# Patient Record
Sex: FEMALE | Race: WHITE | NOT HISPANIC OR LATINO | Employment: STUDENT | URBAN - METROPOLITAN AREA
[De-identification: names, ages, dates, MRNs, and addresses within clinical notes are randomized per-mention and may not be internally consistent; named-entity substitution may affect disease eponyms.]

---

## 2021-06-04 ENCOUNTER — IMMUNIZATION (OUTPATIENT)
Dept: FAMILY MEDICINE | Facility: CLINIC | Age: 21
End: 2021-06-04
Payer: COMMERCIAL

## 2021-06-04 ENCOUNTER — OFFICE VISIT (OUTPATIENT)
Dept: FAMILY MEDICINE | Facility: CLINIC | Age: 21
End: 2021-06-04
Payer: COMMERCIAL

## 2021-06-04 VITALS
TEMPERATURE: 97.5 F | RESPIRATION RATE: 16 BRPM | HEART RATE: 68 BPM | BODY MASS INDEX: 29.32 KG/M2 | WEIGHT: 186.8 LBS | HEIGHT: 67 IN | OXYGEN SATURATION: 98 % | SYSTOLIC BLOOD PRESSURE: 100 MMHG | DIASTOLIC BLOOD PRESSURE: 60 MMHG

## 2021-06-04 DIAGNOSIS — E66.3 OVERWEIGHT (BMI 25.0-29.9): ICD-10-CM

## 2021-06-04 DIAGNOSIS — Z11.3 SCREENING FOR STDS (SEXUALLY TRANSMITTED DISEASES): ICD-10-CM

## 2021-06-04 DIAGNOSIS — Z30.011 ENCOUNTER FOR INITIAL PRESCRIPTION OF CONTRACEPTIVE PILLS: ICD-10-CM

## 2021-06-04 DIAGNOSIS — Z00.00 ROUTINE GENERAL MEDICAL EXAMINATION AT A HEALTH CARE FACILITY: Primary | ICD-10-CM

## 2021-06-04 LAB — HCG UR QL: NEGATIVE

## 2021-06-04 PROCEDURE — 81025 URINE PREGNANCY TEST: CPT | Performed by: PHYSICIAN ASSISTANT

## 2021-06-04 PROCEDURE — 91301 COVID-19,PF,MODERNA: CPT

## 2021-06-04 PROCEDURE — 87491 CHLMYD TRACH DNA AMP PROBE: CPT | Performed by: PHYSICIAN ASSISTANT

## 2021-06-04 PROCEDURE — 99385 PREV VISIT NEW AGE 18-39: CPT | Performed by: PHYSICIAN ASSISTANT

## 2021-06-04 PROCEDURE — 99213 OFFICE O/P EST LOW 20 MIN: CPT | Mod: 25 | Performed by: PHYSICIAN ASSISTANT

## 2021-06-04 PROCEDURE — 87591 N.GONORRHOEAE DNA AMP PROB: CPT | Performed by: PHYSICIAN ASSISTANT

## 2021-06-04 PROCEDURE — 0011A COVID-19,PF,MODERNA: CPT

## 2021-06-04 RX ORDER — NORETHINDRONE ACETATE AND ETHINYL ESTRADIOL .02; 1 MG/1; MG/1
1 TABLET ORAL DAILY
Qty: 84 TABLET | Refills: 3 | Status: SHIPPED | OUTPATIENT
Start: 2021-06-04 | End: 2022-08-16

## 2021-06-04 SDOH — HEALTH STABILITY: MENTAL HEALTH: HOW OFTEN DO YOU HAVE A DRINK CONTAINING ALCOHOL?: NEVER

## 2021-06-04 ASSESSMENT — ENCOUNTER SYMPTOMS
PALPITATIONS: 0
DYSURIA: 0
JOINT SWELLING: 0
WEAKNESS: 0
SHORTNESS OF BREATH: 0
ARTHRALGIAS: 0
FREQUENCY: 0
CONSTIPATION: 0
COUGH: 0
MYALGIAS: 0
NAUSEA: 0
EYE PAIN: 0
BREAST MASS: 0
HEMATURIA: 0
DIARRHEA: 0
NERVOUS/ANXIOUS: 0
PARESTHESIAS: 0
HEMATOCHEZIA: 0
HEADACHES: 0
FEVER: 0
HEARTBURN: 0
ABDOMINAL PAIN: 0
SORE THROAT: 0
CHILLS: 0
DIZZINESS: 0

## 2021-06-04 ASSESSMENT — PAIN SCALES - GENERAL: PAINLEVEL: NO PAIN (0)

## 2021-06-04 ASSESSMENT — MIFFLIN-ST. JEOR: SCORE: 1647.01

## 2021-06-04 NOTE — PROGRESS NOTES
SUBJECTIVE:   CC: Laura Blair is an 20 year old woman who presents for preventive health visit.       Patient has been advised of split billing requirements and indicates understanding: Yes  Healthy Habits:     Getting at least 3 servings of Calcium per day:  Yes    Bi-annual eye exam:  Yes    Dental care twice a year:  Yes    Sleep apnea or symptoms of sleep apnea:  None    Diet:  Regular (no restrictions)    Frequency of exercise:  2-3 days/week    Duration of exercise:  45-60 minutes    Taking medications regularly:  Yes    Medication side effects:  None    PHQ-2 Total Score: 0    Additional concerns today:  No    New patient - previous care through [facility].    Would like to start birth control. She is sexually active, uses condoms. No concern for STI. Has never been on birth control in the past. Is interested in OCP. Non-smoker, no migraine with aura, no family or personal history of blood clotting disorder. No HTN. Has irregular periods. LMP was sometime in May (she is not exactly sure when).    She just finished her freshman year at [employer]. Had to do all distance learning (aside from science labs) due to COVID pandemic. Planning on in person learning next year and looking forward to this.    Today's PHQ-2 Score:   PHQ-2 ( 1999 Pfizer) 6/4/2021   Q1: Little interest or pleasure in doing things 0   Q2: Feeling down, depressed or hopeless 0   PHQ-2 Score 0   Q1: Little interest or pleasure in doing things Not at all   Q2: Feeling down, depressed or hopeless Not at all   PHQ-2 Score 0       Abuse: Current or Past (Physical, Sexual or Emotional) - No  Do you feel safe in your environment? Yes        Social History     Tobacco Use     Smoking status: Never Smoker     Smokeless tobacco: Never Used   Substance Use Topics     Alcohol use: Never     Frequency: Never         Alcohol Use 6/4/2021   Prescreen: >3 drinks/day or >7 drinks/week? No       Reviewed orders with patient.  Reviewed  health maintenance and updated orders accordingly - Yes  Lab work is in process  Labs reviewed in EPIC  BP Readings from Last 3 Encounters:   06/04/21 100/60    Wt Readings from Last 3 Encounters:   06/04/21 84.7 kg (186 lb 12.8 oz) (96 %, Z= 1.73)*     * Growth percentiles are based on [facility] (Girls, 2-20 Years) data.                  Patient Active Problem List   Diagnosis     Overweight (BMI 25.0-29.9)     History reviewed. No pertinent surgical history.    Social History     Tobacco Use     Smoking status: Never Smoker     Smokeless tobacco: Never Used   Substance Use Topics     Alcohol use: Never     Frequency: Never     Family History   Problem Relation Age of Onset     Diabetes Father      Diabetes Paternal Grandmother      Heart Disease Paternal Grandmother      Diabetes Paternal Grandfather      No Known Problems Mother          Current Outpatient Medications   Medication Sig Dispense Refill     norethindrone-ethinyl estradiol (MICROGESTIN 1/20) 1-20 MG-MCG tablet Take 1 tablet by mouth daily 84 tablet 3     Allergies   Allergen Reactions     Zithromax [Azithromycin]      Nausea/vomiting        Breast Cancer Screening:        History of abnormal Pap smear: NO - under age 21, PAP not appropriate for age     Reviewed and updated as needed this visit by clinical staff  Tobacco  Allergies  Meds  Problems  Med Hx  Surg Hx  Fam Hx  Soc Hx          Reviewed and updated as needed this visit by Provider  Tobacco  Allergies  Meds  Problems  Med Hx  Surg Hx  Fam Hx  Soc Hx             Review of Systems   Constitutional: Negative for chills and fever.   HENT: Negative for congestion, ear pain, hearing loss and sore throat.    Eyes: Negative for pain and visual disturbance.   Respiratory: Negative for cough and shortness of breath.    Cardiovascular: Negative for chest pain, palpitations and peripheral edema.   Gastrointestinal: Negative for abdominal pain, constipation, diarrhea, heartburn, hematochezia and  "nausea.   Breasts:  Negative for tenderness, breast mass and discharge.   Genitourinary: Negative for dysuria, frequency, genital sores, hematuria, pelvic pain, urgency, vaginal bleeding and vaginal discharge.   Musculoskeletal: Negative for arthralgias, joint swelling and myalgias.   Skin: Negative for rash.   Neurological: Negative for dizziness, weakness, headaches and paresthesias.   Psychiatric/Behavioral: Negative for mood changes. The patient is not nervous/anxious.         OBJECTIVE:   /60 (BP Location: Right arm, Cuff Size: Adult Regular)   Pulse 68   Temp 97.5  F (36.4  C) (Oral)   Resp 16   Ht 1.689 m (5' 6.5\")   Wt 84.7 kg (186 lb 12.8 oz)   LMP 04/12/2021   SpO2 98%   BMI 29.70 kg/m    Physical Exam  GENERAL: healthy, alert and no distress  EYES: Eyes grossly normal to inspection, PERRL and conjunctivae and sclerae normal  HENT: ear canals and TM's normal, nose and mouth without ulcers or lesions  NECK: no adenopathy, no asymmetry, masses, or scars and thyroid normal to palpation  RESP: lungs clear to auscultation - no rales, rhonchi or wheezes  BREAST: patient declined  CV: regular rate and rhythm, normal S1 S2, no S3 or S4, no murmur, click or rub, no peripheral edema and peripheral pulses strong  ABDOMEN: soft, nontender, no hepatosplenomegaly, no masses and bowel sounds normal   (female): deferred  MS: no gross musculoskeletal defects noted, no edema  SKIN: no suspicious lesions or rashes  NEURO: Normal strength and tone, mentation intact and speech normal  PSYCH: mentation appears normal, affect normal/bright    Diagnostic Test Results:  Labs reviewed in Epic  Results for orders placed or performed in visit on 06/04/21 (from the past 24 hour(s))   HCG Qual, Urine ([id number])   Result Value Ref Range    HCG Qual Urine Negative NEG^Negative       ASSESSMENT/PLAN:   1. Routine general medical examination at a health care facility  Reviewed personal and family history. Reviewed age " "appropriate screenings. Recommended any needed vaccinations. Continue to focus on well balanced diet and exercise. She is getting her COVID vaccine later today. Declines HIV/Hep C testing today, will consider next time we need labs.    2. Screening for STDs (sexually transmitted diseases)  Sexually active but denies concern for STI. Will check G/C. Declines blood testing.  - NEISSERIA GONORRHOEA PCR  - CHLAMYDIA TRACHOMATIS PCR    3. Encounter for initial prescription of contraceptive pills  Risks, benefits and alternatives were discussed with patient. Agreeable to the plan of care. Recheck in 1 year. Discussed quick start or waiting until next cycle. Also discussed use of back up method once starting and while on antibiotics. Does not protect against STDs. UPT negative today. Has had irregular periods - discussed OCP can help regulate periods. She will let me know if still having irregular periods.  - norethindrone-ethinyl estradiol (MICROGESTIN 1/20) 1-20 MG-MCG tablet; Take 1 tablet by mouth daily  Dispense: 84 tablet; Refill: 3  - HCG Qual, Urine ([id number])  - OFFICE/OUTPT VISIT,KIMBERLY BAKER III    4. Overweight (BMI 25.0-29.9)  Discussed healthy diet/exercise    Patient has been advised of split billing requirements and indicates understanding: Yes  COUNSELING:  Reviewed preventive health counseling, as reflected in patient instructions    Estimated body mass index is 29.7 kg/m  as calculated from the following:    Height as of this encounter: 1.689 m (5' 6.5\").    Weight as of this encounter: 84.7 kg (186 lb 12.8 oz).    Weight management plan: Discussed healthy diet and exercise guidelines    She reports that she has never smoked. She has never used smokeless tobacco.      Counseling Resources:  ATP IV Guidelines  Pooled Cohorts Equation Calculator  Breast Cancer Risk Calculator  BRCA-Related Cancer Risk Assessment: FHS-7 Tool  FRAX Risk Assessment  ICSI Preventive Guidelines  Dietary Guidelines for Americans, " 2010  USDA's MyPlate  ASA Prophylaxis  Lung CA Screening    AVTAR Castro [facility]

## 2021-06-05 LAB
C TRACH DNA SPEC QL NAA+PROBE: NEGATIVE
N GONORRHOEA DNA SPEC QL NAA+PROBE: NEGATIVE
SPECIMEN SOURCE: NORMAL
SPECIMEN SOURCE: NORMAL

## 2021-07-02 ENCOUNTER — IMMUNIZATION (OUTPATIENT)
Dept: FAMILY MEDICINE | Facility: CLINIC | Age: 21
End: 2021-07-02
Attending: NURSE PRACTITIONER
Payer: COMMERCIAL

## 2021-07-02 PROCEDURE — 0012A PR COVID VAC MODERNA 100 MCG/0.5 ML IM: CPT

## 2021-07-02 PROCEDURE — 91301 PR COVID VAC MODERNA 100 MCG/0.5 ML IM: CPT

## 2021-10-17 ENCOUNTER — HEALTH MAINTENANCE LETTER (OUTPATIENT)
Age: 21
End: 2021-10-17

## 2021-12-20 ENCOUNTER — OFFICE VISIT (OUTPATIENT)
Dept: FAMILY MEDICINE | Facility: CLINIC | Age: 21
End: 2021-12-20
Payer: COMMERCIAL

## 2021-12-20 VITALS
HEART RATE: 52 BPM | BODY MASS INDEX: 28.24 KG/M2 | TEMPERATURE: 98 F | WEIGHT: 177.6 LBS | RESPIRATION RATE: 12 BRPM | DIASTOLIC BLOOD PRESSURE: 62 MMHG | SYSTOLIC BLOOD PRESSURE: 100 MMHG

## 2021-12-20 DIAGNOSIS — R55 SYNCOPE, UNSPECIFIED SYNCOPE TYPE: Primary | ICD-10-CM

## 2021-12-20 DIAGNOSIS — R63.4 WEIGHT LOSS: ICD-10-CM

## 2021-12-20 LAB
ALBUMIN SERPL-MCNC: 3.8 G/DL (ref 3.4–5)
ALP SERPL-CCNC: 68 U/L (ref 40–150)
ALT SERPL W P-5'-P-CCNC: 23 U/L (ref 0–50)
ANION GAP SERPL CALCULATED.3IONS-SCNC: 2 MMOL/L (ref 3–14)
AST SERPL W P-5'-P-CCNC: 9 U/L (ref 0–45)
BASOPHILS # BLD AUTO: 0 10E3/UL (ref 0–0.2)
BASOPHILS NFR BLD AUTO: 0 %
BILIRUB SERPL-MCNC: 0.4 MG/DL (ref 0.2–1.3)
BUN SERPL-MCNC: 9 MG/DL (ref 7–30)
CALCIUM SERPL-MCNC: 9.5 MG/DL (ref 8.5–10.1)
CHLORIDE BLD-SCNC: 107 MMOL/L (ref 94–109)
CO2 SERPL-SCNC: 30 MMOL/L (ref 20–32)
CREAT SERPL-MCNC: 0.76 MG/DL (ref 0.52–1.04)
EOSINOPHIL # BLD AUTO: 0.2 10E3/UL (ref 0–0.7)
EOSINOPHIL NFR BLD AUTO: 2 %
ERYTHROCYTE [DISTWIDTH] IN BLOOD BY AUTOMATED COUNT: 12.5 % (ref 10–15)
FERRITIN SERPL-MCNC: 40 NG/ML (ref 12–150)
GFR SERPL CREATININE-BSD FRML MDRD: >90 ML/MIN/1.73M2
GLUCOSE BLD-MCNC: 91 MG/DL (ref 70–99)
HBA1C MFR BLD: 5.2 % (ref 0–5.6)
HCT VFR BLD AUTO: 41.9 % (ref 35–47)
HGB BLD-MCNC: 13.5 G/DL (ref 11.7–15.7)
IRON SATN MFR SERPL: 19 % (ref 15–46)
IRON SERPL-MCNC: 64 UG/DL (ref 35–180)
LYMPHOCYTES # BLD AUTO: 2.5 10E3/UL (ref 0.8–5.3)
LYMPHOCYTES NFR BLD AUTO: 33 %
MCH RBC QN AUTO: 28.1 PG (ref 26.5–33)
MCHC RBC AUTO-ENTMCNC: 32.2 G/DL (ref 31.5–36.5)
MCV RBC AUTO: 87 FL (ref 78–100)
MONOCYTES # BLD AUTO: 0.5 10E3/UL (ref 0–1.3)
MONOCYTES NFR BLD AUTO: 7 %
NEUTROPHILS # BLD AUTO: 4.4 10E3/UL (ref 1.6–8.3)
NEUTROPHILS NFR BLD AUTO: 58 %
PLATELET # BLD AUTO: 350 10E3/UL (ref 150–450)
POTASSIUM BLD-SCNC: 3.8 MMOL/L (ref 3.4–5.3)
PROT SERPL-MCNC: 7.3 G/DL (ref 6.8–8.8)
RBC # BLD AUTO: 4.81 10E6/UL (ref 3.8–5.2)
SODIUM SERPL-SCNC: 139 MMOL/L (ref 133–144)
TIBC SERPL-MCNC: 343 UG/DL (ref 240–430)
TSH SERPL DL<=0.005 MIU/L-ACNC: 1.41 MU/L (ref 0.4–4)
WBC # BLD AUTO: 7.5 10E3/UL (ref 4–11)

## 2021-12-20 PROCEDURE — 82728 ASSAY OF FERRITIN: CPT | Performed by: NURSE PRACTITIONER

## 2021-12-20 PROCEDURE — 83550 IRON BINDING TEST: CPT | Performed by: NURSE PRACTITIONER

## 2021-12-20 PROCEDURE — 36415 COLL VENOUS BLD VENIPUNCTURE: CPT | Performed by: NURSE PRACTITIONER

## 2021-12-20 PROCEDURE — 80050 GENERAL HEALTH PANEL: CPT | Performed by: NURSE PRACTITIONER

## 2021-12-20 PROCEDURE — 83036 HEMOGLOBIN GLYCOSYLATED A1C: CPT | Performed by: NURSE PRACTITIONER

## 2021-12-20 PROCEDURE — 99214 OFFICE O/P EST MOD 30 MIN: CPT | Performed by: NURSE PRACTITIONER

## 2021-12-20 ASSESSMENT — PAIN SCALES - GENERAL: PAINLEVEL: NO PAIN (0)

## 2021-12-20 NOTE — PROGRESS NOTES
"  Assessment & Plan     Syncope, unspecified syncope type  Discussed initial eval with labs.  Could very well be due to menorrhagia.  If labs are normal will likely order echo and zio patch.  4 week follow up.  Pt agrees with plan and verbalized understanding.  - CBC with platelets and differential; Future  - Comprehensive metabolic panel (BMP + Alb, Alk Phos, ALT, AST, Total. Bili, TP); Future  - Hemoglobin A1c; Future  - TSH with free T4 reflex; Future  - Ferritin; Future  - Iron and iron binding capacity; Future    Weight loss  Labs today, recheck in 4 weeks.       BMI:   Estimated body mass index is 28.24 kg/m  as calculated from the following:    Height as of 6/4/21: 1.689 m (5' 6.5\").    Weight as of this encounter: 80.6 kg (177 lb 9.6 oz).       Return in about 4 weeks (around 1/17/2022) for follow up.    ELY Cunningham Ra CNP  M [facility] KEVEN Sanchez is a 21 year old who presents for the following health issues     HPI     Dizziness  Onset/Duration: 6 days ago  Description:   Do you feel faint: YES- patient fainted   Does it feel like the surroundings (bed, room) are moving: no  Unsteady/off balance: YES  Have you passed out or fallen: YES- patient passed out last Tuesday   Intensity: mild  Progression of Symptoms: improving  Accompanying Signs & Symptoms:  Heart palpitations or chest pain: no  Nausea, vomiting: YES  Weakness or lack of coordination in arms or legs: no  Vision or speech changes: YES- blurry vision   Numbness or tingling: no  Ringing in ears (Tinnitus): no  Hearing Loss: no  History:   Head trauma/concussion history: YES  Previous similar symptoms: no, but feeling really tired all of the time and bruising very easily.    Recent bleeding history: no  Any new medications (BP?): no  Precipitating factors:   Worse with activity: no  Worse with head movement: no  Alleviating factors:   Does staying in a fixed position give relief: no   Therapies tried and " outcome: None    Syncopal episode 7 days ago.  Was hanging with friends and passed out.  She bruised her tailbone.  She was not exerting herself at the time.  Felt like she had not taken in enough fluids that day, but had been eating as usual.  She denies chest pain, palpitations, sob.  No LE edema.  She is busy at work and on her feet at [employer].  She also walks her dog for 45 minutes at a time without problem.  She is sleeping without problem.  Periods are heavy.  5 days, first 3 are very heavy.  This is not new.  She does take a combined OCP.  She notes losing about 30# over the past 1 year.  Eating about the same slightly less at school.  She eats about 2.5 meals per day.  She believes her weight has somewhat stabilized but she is still losing weight.  She feels tired most of the time.  She eats a full diet.  No fevers, no recent illness.  Normal urinary and bowel habits, no black or bloody stools.  Mood has been stable.    She did have one other syncopal episode about 2 years ago but believes this was due to intake that day.    Goes to school at [employer].  No etoh or smoking.  + sexually active.    No family hx of enlarged heart or sudden cardiac death.    Review of Systems   Constitutional, HEENT, cardiovascular, pulmonary, gi and gu systems are negative, except as otherwise noted.      Objective    /62 (BP Location: Right arm, Patient Position: Sitting, Cuff Size: Adult Regular)   Pulse 52   Temp 98  F (36.7  C) (Oral)   Resp 12   Wt 80.6 kg (177 lb 9.6 oz)   LMP 12/13/2021   BMI 28.24 kg/m    Body mass index is 28.24 kg/m .  Physical Exam   GENERAL: healthy, alert and no distress  NECK: no adenopathy, no asymmetry, masses, or scars and thyroid normal to palpation  RESP: lungs clear to auscultation - no rales, rhonchi or wheezes  CV: regular rate and rhythm, normal S1 S2, no S3 or S4, no murmur, click or rub, no peripheral edema and peripheral pulses strong  ABDOMEN: soft, nontender, no  hepatosplenomegaly, no masses and bowel sounds normal  NEURO: Normal strength and tone, sensory exam grossly normal, mentation intact, cranial nerves 2-12 intact and rapid alternating movements normal  PSYCH: mentation appears normal, affect normal/bright    No results found for this or any previous visit (from the past 24 hour(s)).

## 2021-12-20 NOTE — NURSING NOTE
"Chief Complaint   Patient presents with     Dizziness     Blood Draw     LABS.       Syncope     Initial /62 (BP Location: Right arm, Patient Position: Sitting, Cuff Size: Adult Regular)   Pulse 52   Temp 98  F (36.7  C) (Oral)   Resp 12   Wt 80.6 kg (177 lb 9.6 oz)   LMP 12/13/2021   BMI 28.24 kg/m   Estimated body mass index is 28.24 kg/m  as calculated from the following:    Height as of 6/4/21: 1.689 m (5' 6.5\").    Weight as of this encounter: 80.6 kg (177 lb 9.6 oz).  BP completed using cuff size regular long right arm    Lisa Magill, CMA    "

## 2022-02-04 ENCOUNTER — OFFICE VISIT (OUTPATIENT)
Dept: FAMILY MEDICINE | Facility: CLINIC | Age: 22
End: 2022-02-04
Payer: COMMERCIAL

## 2022-02-04 VITALS
TEMPERATURE: 97.3 F | OXYGEN SATURATION: 99 % | HEART RATE: 63 BPM | WEIGHT: 177 LBS | DIASTOLIC BLOOD PRESSURE: 72 MMHG | BODY MASS INDEX: 28.14 KG/M2 | SYSTOLIC BLOOD PRESSURE: 110 MMHG | RESPIRATION RATE: 16 BRPM

## 2022-02-04 DIAGNOSIS — B00.1 COLD SORE: ICD-10-CM

## 2022-02-04 DIAGNOSIS — R55 SYNCOPE, UNSPECIFIED SYNCOPE TYPE: ICD-10-CM

## 2022-02-04 DIAGNOSIS — Z23 NEED FOR VACCINATION: Primary | ICD-10-CM

## 2022-02-04 PROCEDURE — 99214 OFFICE O/P EST MOD 30 MIN: CPT | Mod: 25 | Performed by: NURSE PRACTITIONER

## 2022-02-04 PROCEDURE — 90686 IIV4 VACC NO PRSV 0.5 ML IM: CPT | Performed by: NURSE PRACTITIONER

## 2022-02-04 PROCEDURE — 90471 IMMUNIZATION ADMIN: CPT | Performed by: NURSE PRACTITIONER

## 2022-02-04 PROCEDURE — 0064A COVID-19,PF,MODERNA (18+ YRS BOOSTER .25ML): CPT | Performed by: NURSE PRACTITIONER

## 2022-02-04 PROCEDURE — 91306 COVID-19,PF,MODERNA (18+ YRS BOOSTER .25ML): CPT | Performed by: NURSE PRACTITIONER

## 2022-02-04 RX ORDER — VALACYCLOVIR HYDROCHLORIDE 1 G/1
2000 TABLET, FILM COATED ORAL 2 TIMES DAILY
Qty: 4 TABLET | Refills: 4 | Status: SHIPPED | OUTPATIENT
Start: 2022-02-04 | End: 2023-01-09

## 2022-02-04 ASSESSMENT — PAIN SCALES - GENERAL: PAINLEVEL: NO PAIN (0)

## 2022-02-04 NOTE — PROGRESS NOTES
"  Assessment & Plan     Need for vaccination  - COVID-19,PF,MODERNA (18+ YRS BOOSTER .25ML)    Cold sore  - valACYclovir (VALTREX) 1000 mg tablet; Take 2 tablets (2,000 mg) by mouth 2 times daily for 1 day    Syncope, unspecified syncope type- Echocardiogram Complete; Future  - Leadless EKG Monitor 3 to 7 Days; Future  - Adult Cardiology Eval Referral; Future             BMI:   Estimated body mass index is 28.14 kg/m  as calculated from the following:    Height as of 6/4/21: 1.689 m (5' 6.5\").    Weight as of this encounter: 80.3 kg (177 lb).           Return for follow up.    Sade Kim, ELY CNP  [facility] KEVEN Sanchez is a 21 year old who presents for the following health issues  accompanied by her mother.    HPI     Follow up syncopal episodes.  Intermittent, continued episodes of near syncope.  Previous labs wnl.  Weight has stabilized.    Recurrent cold sores, associated with stress.  More stress currently with start of new quarter at school.    Review of Systems   Constitutional, HEENT, cardiovascular, pulmonary, gi and gu systems are negative, except as otherwise noted.      Objective    /72 (BP Location: Right arm, Patient Position: Sitting, Cuff Size: Adult Regular)   Pulse 63   Temp 97.3  F (36.3  C) (Oral)   Resp 16   Wt 80.3 kg (177 lb)   LMP 01/24/2022 (Exact Date)   SpO2 99%   Breastfeeding No   BMI 28.14 kg/m    Body mass index is 28.14 kg/m .  Physical Exam   GENERAL: healthy, alert and no distress  RESP: lungs clear to auscultation - no rales, rhonchi or wheezes  CV: regular rate and rhythm, normal S1 S2, no S3 or S4, no murmur, click or rub, no peripheral edema and peripheral pulses strong  PSYCH: mentation appears normal, affect normal/bright    CBC RESULTS: Recent Labs   Lab Test 12/20/21  0907   WBC 7.5   RBC 4.81   HGB 13.5   HCT 41.9   MCV 87   MCH 28.1   MCHC 32.2   RDW 12.5        Last Comprehensive Metabolic Panel:  Sodium "   Date Value Ref Range Status   12/20/2021 139 133 - 144 mmol/L Final     Potassium   Date Value Ref Range Status   12/20/2021 3.8 3.4 - 5.3 mmol/L Final     Chloride   Date Value Ref Range Status   12/20/2021 107 94 - 109 mmol/L Final     Carbon Dioxide (CO2)   Date Value Ref Range Status   12/20/2021 30 20 - 32 mmol/L Final     Anion Gap   Date Value Ref Range Status   12/20/2021 2 (L) 3 - 14 mmol/L Final     Glucose   Date Value Ref Range Status   12/20/2021 91 70 - 99 mg/dL Final     Urea Nitrogen   Date Value Ref Range Status   12/20/2021 9 7 - 30 mg/dL Final     Creatinine   Date Value Ref Range Status   12/20/2021 0.76 0.52 - 1.04 mg/dL Final     GFR Estimate   Date Value Ref Range Status   12/20/2021 >90 >60 mL/min/1.73m2 Final     Comment:     As of July 11, 2021, eGFR is calculated by the CKD-EPI creatinine equation, without race adjustment. eGFR can be influenced by muscle mass, exercise, and diet. The reported eGFR is an estimation only and is only applicable if the renal function is stable.     Calcium   Date Value Ref Range Status   12/20/2021 9.5 8.5 - 10.1 mg/dL Final

## 2022-02-18 ENCOUNTER — HOSPITAL ENCOUNTER (OUTPATIENT)
Dept: CARDIOLOGY | Facility: CLINIC | Age: 22
Discharge: HOME | End: 2022-02-18
Attending: NURSE PRACTITIONER | Admitting: NURSE PRACTITIONER
Payer: COMMERCIAL

## 2022-02-18 DIAGNOSIS — R55 SYNCOPE, UNSPECIFIED SYNCOPE TYPE: ICD-10-CM

## 2022-02-18 PROCEDURE — 93242 EXT ECG>48HR<7D RECORDING: CPT

## 2022-02-18 PROCEDURE — 93244 EXT ECG>48HR<7D REV&INTERPJ: CPT | Performed by: INTERNAL MEDICINE

## 2022-02-25 ENCOUNTER — HOSPITAL ENCOUNTER (OUTPATIENT)
Dept: CARDIOLOGY | Facility: CLINIC | Age: 22
Discharge: HOME | End: 2022-02-25
Attending: NURSE PRACTITIONER | Admitting: NURSE PRACTITIONER
Payer: COMMERCIAL

## 2022-02-25 DIAGNOSIS — R55 SYNCOPE, UNSPECIFIED SYNCOPE TYPE: ICD-10-CM

## 2022-02-25 LAB — LVEF ECHO: NORMAL

## 2022-02-25 PROCEDURE — 93306 TTE W/DOPPLER COMPLETE: CPT

## 2022-02-25 PROCEDURE — 93306 TTE W/DOPPLER COMPLETE: CPT | Mod: 26 | Performed by: INTERNAL MEDICINE

## 2022-04-01 ENCOUNTER — OFFICE VISIT (OUTPATIENT)
Dept: CARDIOLOGY | Facility: CLINIC | Age: 22
End: 2022-04-01
Attending: NURSE PRACTITIONER
Payer: COMMERCIAL

## 2022-04-01 VITALS
WEIGHT: 176 LBS | HEIGHT: 67 IN | DIASTOLIC BLOOD PRESSURE: 65 MMHG | BODY MASS INDEX: 27.62 KG/M2 | OXYGEN SATURATION: 99 % | HEART RATE: 48 BPM | SYSTOLIC BLOOD PRESSURE: 108 MMHG

## 2022-04-01 DIAGNOSIS — R55 VASOVAGAL SYNCOPE: Primary | ICD-10-CM

## 2022-04-01 DIAGNOSIS — R55 SYNCOPE, UNSPECIFIED SYNCOPE TYPE: ICD-10-CM

## 2022-04-01 PROCEDURE — 99205 OFFICE O/P NEW HI 60 MIN: CPT | Performed by: INTERNAL MEDICINE

## 2022-04-01 PROCEDURE — 93000 ELECTROCARDIOGRAM COMPLETE: CPT | Performed by: INTERNAL MEDICINE

## 2022-04-01 NOTE — PROGRESS NOTES
Cardiology Clinic Consultation:    April 1, 2022   Patient Name: Laura Blair  Patient MRN: [id number]    Consult indication: syncope    HPI:    I had the opportunity to see patient Laura Blair in cardiology clinic for a consultation. Patient is followed by our colleague Bibiana Palacio PA-C with Primary Care.     As you know, patient is a pleasant 21-year-old female with no significant past medical history who presents for further evaluation management of syncope.    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  Previously in high school patient was very physically active, participated in competitive dance.      Patient has had 2 episodes of syncope.  The first episode was approximately 2 years ago, patient reports that she had been spending most of her time outdoors that day, and after standing from a seated position and walking for several steps, she felt acutely lightheaded, and dizzy, and lost consciousness.  She reports that she likely was dehydrated that day.  The second episode of syncope occurred approximately 4 months ago.  She was at a social gathering with friends, when she felt an acute sensation of warmth/flushing, associated with diaphoresis, and she felt suddenly lightheaded, and briefly lost consciousness.  She denies any loss of bowel/bladder control, no clear convulsions per witnesses, did not bite her tongue.  Following this, she felt nauseated.  She denies preceding palpitations.  She denies any chest pain, chest pressure, abnormal shortness of breath.  No recent change in exertional capacity, lower extremity swelling.    Patient also reports some degree of baseline anxiety with social situations, also has had presyncopal symptoms with lab draws/needle sticks.    She drinks alcohol occasionally, uses cannabis recreationally a few times a  week, does not smoke cigarettes, no stimulants.  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    Evaluation so far has included an echocardiogram 2/25/2022 that demonstrated normal biventricular function, no significant valvular abnormalities.  A Zio patch monitor was in place from 2/18/2022 to 2/25/2022 that demonstrated normal sinus rhythm, normal average heart rate of 71 bpm, less than 1% PACs and less than 1% PVCs, no atrial fibrillation, no paroxysmal SVT, no evidence of advanced heart block.  Triggered events corresponded to sinus rhythm and PACs/PVCs.    Labs from 12/20/2021 reviewed, potassium 3.8, creatinine 0.76, LFTs normal, TSH normal, CBC normal.    ECG today in clinic demonstrates sinus bradycardia at 47 bpm, normal axis, normal intervals, no acute ischemic changes,  ms.    Orthostatic vital signs today in clinic were negative.    Assessment and Plan/Recommendations:    # Syncope. The first episode of syncope approximately 2 years ago seems more likely related to orthostatic hypotension, the second episode in December is most consistent with vasovagal syncope.  Reassuringly, cardiac work-up so far has been normal, including ECG, Zio patch monitor, and echocardiogram.    -Counseled on recognizing symptoms and mitigating triggering factors, counterpressure techniques, ensuring adequate hydration and supplementing with electrolyte mix such as Liquid IV or similar, consideration of compression stockings.  Advised to limit alcohol/cannabis use.  Scheduled follow-up in cardiology clinic is not warranted at this time, however we remain available as needed in the future.    Thank you for allowing our team to participate in the care of Laura Blair.  Please do not hesitate to call or page me with  "any questions or concerns.    Sincerely,     Aguilar Bloom MD, [facility]  Cardiology  April 1, 2022    cc  Sade Kim, APRN CNP  [address] [place] [address]    Voice recognition software utilized.     Total time spent on this encounter: 65 minutes, providing care in this encounter including, but not limited to, reviewing prior medical records, laboratory data, imaging studies, diagnostic studies, procedure notes, formulating an assessment and plan, recommendations, discussion and counseling with patient face to face, dictation.    Past Medical History:     Patient Active Problem List   Diagnosis     Overweight (BMI 25.0-29.9)       Past Surgical History:   No prior cardiac surgical history    Medications (outpatient):  Current Outpatient Medications   Medication Sig Dispense Refill     norethindrone-ethinyl estradiol (MICROGESTIN 1/20) 1-20 MG-MCG tablet Take 1 tablet by mouth daily 84 tablet 3     valACYclovir (VALTREX) 1000 mg tablet Take 2 tablets (2,000 mg) by mouth 2 times daily for 1 day 4 tablet 4       Allergies:  Allergies   Allergen Reactions     Zithromax [Azithromycin]      Nausea/vomiting        Social History:   History   Drug Use Unknown      History   Smoking Status     Never Smoker   Smokeless Tobacco     Never Used     Social History    Substance and Sexual Activity      Alcohol use: Never       Family History:  Family History   Problem Relation Age of Onset     Diabetes Father      Diabetes Paternal Grandmother      Heart Disease Paternal Grandmother      Diabetes Paternal Grandfather      No Known Problems Mother        Review of Systems:   A complete review of systems was negative except as mentioned in the History of Present Illness.     Objective & Physical Exam:  Ht 1.689 m (5' 6.5\")   Wt 79.8 kg (176 lb)   SpO2 99%   BMI 27.98 kg/m    Wt Readings from Last 2 Encounters:   04/01/22 79.8 kg (176 lb)   02/04/22 80.3 kg (177 lb)     Body mass index is 27.98 " kg/m .   Body surface area is 1.93 meters squared.    Constitutional: appears stated age, in no apparent distress, appears to be well nourished  Head: normocephalic, atraumatic  Neck: supple, trachea midline, no bruit bilaterally   Pulmonary: clear to auscultation bilaterally, no wheezes, no rales, no increased work of breathing  Cardiovascular: JVP normal, regular rate, regular rhythm, normal S1 and S2, no S3, S4, no murmur appreciated, no lower extremity edema  Gastrointestinal: no guarding, non-rigid   Neurologic: awake, alert, moves all extremities  Skin: no jaundice, warm on limited exam  Psychiatric: affect is normal, answers questions appropriately, oriented to self and place    Data reviewed:  Lab Results   Component Value Date    WBC 7.5 12/20/2021    RBC 4.81 12/20/2021    HGB 13.5 12/20/2021    HCT 41.9 12/20/2021    MCV 87 12/20/2021    MCH 28.1 12/20/2021    MCHC 32.2 12/20/2021    RDW 12.5 12/20/2021     12/20/2021     Sodium   Date Value Ref Range Status   12/20/2021 139 133 - 144 mmol/L Final     Potassium   Date Value Ref Range Status   12/20/2021 3.8 3.4 - 5.3 mmol/L Final     Chloride   Date Value Ref Range Status   12/20/2021 107 94 - 109 mmol/L Final     Carbon Dioxide (CO2)   Date Value Ref Range Status   12/20/2021 30 20 - 32 mmol/L Final     Anion Gap   Date Value Ref Range Status   12/20/2021 2 (L) 3 - 14 mmol/L Final     Glucose   Date Value Ref Range Status   12/20/2021 91 70 - 99 mg/dL Final     Urea Nitrogen   Date Value Ref Range Status   12/20/2021 9 7 - 30 mg/dL Final     Creatinine   Date Value Ref Range Status   12/20/2021 0.76 0.52 - 1.04 mg/dL Final     GFR Estimate   Date Value Ref Range Status   12/20/2021 >90 >60 mL/min/1.73m2 Final     Comment:     As of July 11, 2021, eGFR is calculated by the CKD-EPI creatinine equation, without race adjustment. eGFR can be influenced by muscle mass, exercise, and diet. The reported eGFR is an estimation only and is only applicable if  the renal function is stable.     Calcium   Date Value Ref Range Status   2021 9.5 8.5 - 10.1 mg/dL Final     Bilirubin Total   Date Value Ref Range Status   2021 0.4 0.2 - 1.3 mg/dL Final     Alkaline Phosphatase   Date Value Ref Range Status   2021 68 40 - 150 U/L Final     ALT   Date Value Ref Range Status   2021 23 0 - 50 U/L Final     AST   Date Value Ref Range Status   2021 9 0 - 45 U/L Final     No results for input(s): CHOL, HDL, LDL, TRIG, CHOLHDLRATIO in the last 28918 hours.   Lab Results   Component Value Date    A1C 5.2 2021        Recent Results (from the past 4320 hour(s))   Echocardiogram Complete   Result Value    LVEF  60-65%    Narrative    [id number]  [id number]  [id number]  [id number]^KRISH^CHRISTIANA^LAKESHIA     [facility]  Echocardiography Laboratory  [address]  [address]  [place] [address]  Phone [phone]  Fax [phone]     Name: MONSTER ARAGON  MRN: [id number]  : 2000  Study Date: 2022 12:44 PM  Age: 21 yrs  Gender: Female  Patient Location: [facility]  Reason For Study: Syncope, unspecified syncope type  Ordering Physician: CHRISTIANA RUTHERFORD RA  Referring Physician: Bibiana Palacio  Performed By: John Pradhan RDCS     BSA: 1.9 m2  Height: 67 in  Weight: 177 lb  HR: 48  BP: 119/84 mmHg  ______________________________________________________________________________  Procedure  Complete Echo Adult.     ______________________________________________________________________________  Interpretation Summary     Normal transthoracic echocardiogram.     No prior study available for comparison.  ______________________________________________________________________________  Left Ventricle  The left ventricle is normal in size. There is normal left ventricular wall  thickness. Left ventricular systolic function is normal. The visual ejection  fraction is 60-65%. Left ventricular diastolic  function is normal. Normal left  ventricular wall motion.     Right Ventricle  The right ventricle is normal size. The right ventricular systolic function is  normal.     Atria  Normal left atrial size. Right atrial size is normal. There is no color  Doppler evidence of an atrial shunt.     Mitral Valve  The mitral valve is normal in structure and function. There is trace mitral  regurgitation. There is no mitral valve stenosis.     Tricuspid Valve  The tricuspid valve is not well visualized, but is grossly normal. There is  trace tricuspid regurgitation. The right ventricular systolic pressure is  approximated at 12.6 mmHg plus the right atrial pressure. IVC diameter <2.1 cm  collapsing >50% with sniff suggests a normal RA pressure of 3 mmHg.     Aortic Valve  Normal tricuspid aortic valve. No aortic regurgitation is present. No aortic  stenosis is present.     Pulmonic Valve  The pulmonic valve is not well visualized.     Vessels  The aortic root is normal size. Normal size ascending aorta.     Pericardium  There is no pericardial effusion.     ______________________________________________________________________________  MMode/2D Measurements & Calculations  IVSd: 1.0 cm  LVIDd: 4.9 cm  LVIDs: 2.9 cm  LVPWd: 0.95 cm  FS: 39.8 %  LV mass(C)d: 175.1 grams  LV mass(C)dI: 91.2 grams/m2  Ao root diam: 3.2 cm  LA dimension: 2.9 cm  asc Aorta Diam: 2.6 cm  LA/Ao: 0.89  LA Volume (BP): 49.7 ml     LA Volume Index (BP): 25.9 ml/m2  RWT: 0.39     Doppler Measurements & Calculations  MV E max leonardo: 82.7 cm/sec  MV A max leonardo: 25.3 cm/sec  MV E/A: 3.3  MV dec slope: 241.1 cm/sec2  PA acc time: 0.20 sec  TR max leonardo: 177.4 cm/sec  TR max P.6 mmHg  E/E' av.2  Lateral E/e': 4.5     Medial E/e': 7.9     ______________________________________________________________________________  Report approved by: MD Deshawn Martinez 2022 02:48 PM

## 2022-04-01 NOTE — LETTER
4/1/2022    Bibiana Palacio PA-C  [address] [address]    RE: Laura Blair       Dear Colleague,     I had the pleasure of seeing Laura Blair in the [facility].      Cardiology Clinic Consultation:    April 1, 2022   Patient Name: Laura Blair  Patient MRN: [id number]    Consult indication: syncope    HPI:    I had the opportunity to see patient Laura Blair in cardiology clinic for a consultation. Patient is followed by our colleague Bibiana Palacio PA-C with Primary Care.     As you know, patient is a pleasant 21-year-old female with no significant past medical history who presents for further evaluation management of syncope.    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  Previously in high school patient was very physically active, participated in competitive dance.      Patient has had 2 episodes of syncope.  The first episode was approximately 2 years ago, patient reports that she had been spending most of her time outdoors that day, and after standing from a seated position and walking for several steps, she felt acutely lightheaded, and dizzy, and lost consciousness.  She reports that she likely was dehydrated that day.  The second episode of syncope occurred approximately 4 months ago.  She was at a social gathering with friends, when she felt an acute sensation of warmth/flushing, associated with diaphoresis, and she felt suddenly lightheaded, and briefly lost consciousness.  She denies any loss of bowel/bladder control, no clear convulsions per witnesses, did not bite her tongue.  Following this, she felt nauseated.  She denies preceding palpitations.  She denies any chest pain, chest pressure, abnormal shortness of breath.  No recent change in exertional capacity, lower extremity  swelling.    Patient also reports some degree of baseline anxiety with social situations, also has had presyncopal symptoms with lab draws/needle sticks.    She drinks alcohol occasionally, uses cannabis recreationally a few times a week, does not smoke cigarettes, no stimulants.  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    Evaluation so far has included an echocardiogram 2/25/2022 that demonstrated normal biventricular function, no significant valvular abnormalities.  A Zio patch monitor was in place from 2/18/2022 to 2/25/2022 that demonstrated normal sinus rhythm, normal average heart rate of 71 bpm, less than 1% PACs and less than 1% PVCs, no atrial fibrillation, no paroxysmal SVT, no evidence of advanced heart block.  Triggered events corresponded to sinus rhythm and PACs/PVCs.    Labs from 12/20/2021 reviewed, potassium 3.8, creatinine 0.76, LFTs normal, TSH normal, CBC normal.    ECG today in clinic demonstrates sinus bradycardia at 47 bpm, normal axis, normal intervals, no acute ischemic changes,  ms.    Orthostatic vital signs today in clinic were negative.    Assessment and Plan/Recommendations:    # Syncope. The first episode of syncope approximately 2 years ago seems more likely related to orthostatic hypotension, the second episode in December is most consistent with vasovagal syncope.  Reassuringly, cardiac work-up so far has been normal, including ECG, Zio patch monitor, and echocardiogram.    -Counseled on recognizing symptoms and mitigating triggering factors, counterpressure techniques, ensuring adequate hydration and supplementing with electrolyte mix such as Liquid IV or similar, consideration of compression stockings.  Advised to limit alcohol/cannabis use.  Scheduled follow-up in  cardiology clinic is not warranted at this time, however we remain available as needed in the future.    Thank you for allowing our team to participate in the care of Laura Blair.  Please do not hesitate to call or page me with any questions or concerns.    Sincerely,     Aguilar Bloom MD, [facility]  Cardiology  April 1, 2022    cc  Sade Kim, APRN CNP  [address] [place] [address]    Voice recognition software utilized.     Total time spent on this encounter: 65 minutes, providing care in this encounter including, but not limited to, reviewing prior medical records, laboratory data, imaging studies, diagnostic studies, procedure notes, formulating an assessment and plan, recommendations, discussion and counseling with patient face to face, dictation.    Past Medical History:     Patient Active Problem List   Diagnosis     Overweight (BMI 25.0-29.9)       Past Surgical History:   No prior cardiac surgical history    Medications (outpatient):  Current Outpatient Medications   Medication Sig Dispense Refill     norethindrone-ethinyl estradiol (MICROGESTIN 1/20) 1-20 MG-MCG tablet Take 1 tablet by mouth daily 84 tablet 3     valACYclovir (VALTREX) 1000 mg tablet Take 2 tablets (2,000 mg) by mouth 2 times daily for 1 day 4 tablet 4       Allergies:  Allergies   Allergen Reactions     Zithromax [Azithromycin]      Nausea/vomiting        Social History:   History   Drug Use Unknown      History   Smoking Status     Never Smoker   Smokeless Tobacco     Never Used     Social History    Substance and Sexual Activity      Alcohol use: Never       Family History:  Family History   Problem Relation Age of Onset     Diabetes Father      Diabetes Paternal Grandmother      Heart Disease Paternal Grandmother      Diabetes Paternal Grandfather      No Known Problems Mother        Review of Systems:   A complete review of systems was negative except as mentioned in the History of Present  "Illness.     Objective & Physical Exam:  Ht 1.689 m (5' 6.5\")   Wt 79.8 kg (176 lb)   SpO2 99%   BMI 27.98 kg/m    Wt Readings from Last 2 Encounters:   04/01/22 79.8 kg (176 lb)   02/04/22 80.3 kg (177 lb)     Body mass index is 27.98 kg/m .   Body surface area is 1.93 meters squared.    Constitutional: appears stated age, in no apparent distress, appears to be well nourished  Head: normocephalic, atraumatic  Neck: supple, trachea midline, no bruit bilaterally   Pulmonary: clear to auscultation bilaterally, no wheezes, no rales, no increased work of breathing  Cardiovascular: JVP normal, regular rate, regular rhythm, normal S1 and S2, no S3, S4, no murmur appreciated, no lower extremity edema  Gastrointestinal: no guarding, non-rigid   Neurologic: awake, alert, moves all extremities  Skin: no jaundice, warm on limited exam  Psychiatric: affect is normal, answers questions appropriately, oriented to self and place    Data reviewed:  Lab Results   Component Value Date    WBC 7.5 12/20/2021    RBC 4.81 12/20/2021    HGB 13.5 12/20/2021    HCT 41.9 12/20/2021    MCV 87 12/20/2021    MCH 28.1 12/20/2021    MCHC 32.2 12/20/2021    RDW 12.5 12/20/2021     12/20/2021     Sodium   Date Value Ref Range Status   12/20/2021 139 133 - 144 mmol/L Final     Potassium   Date Value Ref Range Status   12/20/2021 3.8 3.4 - 5.3 mmol/L Final     Chloride   Date Value Ref Range Status   12/20/2021 107 94 - 109 mmol/L Final     Carbon Dioxide (CO2)   Date Value Ref Range Status   12/20/2021 30 20 - 32 mmol/L Final     Anion Gap   Date Value Ref Range Status   12/20/2021 2 (L) 3 - 14 mmol/L Final     Glucose   Date Value Ref Range Status   12/20/2021 91 70 - 99 mg/dL Final     Urea Nitrogen   Date Value Ref Range Status   12/20/2021 9 7 - 30 mg/dL Final     Creatinine   Date Value Ref Range Status   12/20/2021 0.76 0.52 - 1.04 mg/dL Final     GFR Estimate   Date Value Ref Range Status   12/20/2021 >90 >60 mL/min/1.73m2 Final     " Comment:     As of 2021, eGFR is calculated by the CKD-EPI creatinine equation, without race adjustment. eGFR can be influenced by muscle mass, exercise, and diet. The reported eGFR is an estimation only and is only applicable if the renal function is stable.     Calcium   Date Value Ref Range Status   2021 9.5 8.5 - 10.1 mg/dL Final     Bilirubin Total   Date Value Ref Range Status   2021 0.4 0.2 - 1.3 mg/dL Final     Alkaline Phosphatase   Date Value Ref Range Status   2021 68 40 - 150 U/L Final     ALT   Date Value Ref Range Status   2021 23 0 - 50 U/L Final     AST   Date Value Ref Range Status   2021 9 0 - 45 U/L Final     No results for input(s): CHOL, HDL, LDL, TRIG, CHOLHDLRATIO in the last 67560 hours.   Lab Results   Component Value Date    A1C 5.2 2021        Recent Results (from the past 4320 hour(s))   Echocardiogram Complete   Result Value    LVEF  60-65%    Narrative    [id number]  [id number]  [id number]  [id number]^KRISH^CHRISTIANA^LAKESHIA     [facility]  [facility]  Echocardiography Laboratory  [address]  [address]  [place] [address]  Phone [phone]  Fax [phone]     Name: MONSTER ARAGON  MRN: [id number]  : 2000  Study Date: 2022 12:44 PM  Age: 21 yrs  Gender: Female  Patient [facility]: [facility]  Reason For Study: Syncope, unspecified syncope type  Ordering Physician: CHRISTIANA RUTHERFORD RA  Referring Physician: Bibiana Palacio  Performed By: John Pradhan RDCS     BSA: 1.9 m2  Height: 67 in  Weight: 177 lb  HR: 48  BP: 119/84 mmHg  ______________________________________________________________________________  Procedure  Complete Echo Adult.     ______________________________________________________________________________  Interpretation Summary     Normal transthoracic echocardiogram.     No prior study available for  comparison.  ______________________________________________________________________________  Left Ventricle  The left ventricle is normal in size. There is normal left ventricular wall  thickness. Left ventricular systolic function is normal. The visual ejection  fraction is 60-65%. Left ventricular diastolic function is normal. Normal left  ventricular wall motion.     Right Ventricle  The right ventricle is normal size. The right ventricular systolic function is  normal.     Atria  Normal left atrial size. Right atrial size is normal. There is no color  Doppler evidence of an atrial shunt.     Mitral Valve  The mitral valve is normal in structure and function. There is trace mitral  regurgitation. There is no mitral valve stenosis.     Tricuspid Valve  The tricuspid valve is not well visualized, but is grossly normal. There is  trace tricuspid regurgitation. The right ventricular systolic pressure is  approximated at 12.6 mmHg plus the right atrial pressure. IVC diameter <2.1 cm  collapsing >50% with sniff suggests a normal RA pressure of 3 mmHg.     Aortic Valve  Normal tricuspid aortic valve. No aortic regurgitation is present. No aortic  stenosis is present.     Pulmonic Valve  The pulmonic valve is not well visualized.     Vessels  The aortic root is normal size. Normal size ascending aorta.     Pericardium  There is no pericardial effusion.     ______________________________________________________________________________  MMode/2D Measurements & Calculations  IVSd: 1.0 cm  LVIDd: 4.9 cm  LVIDs: 2.9 cm  LVPWd: 0.95 cm  FS: 39.8 %  LV mass(C)d: 175.1 grams  LV mass(C)dI: 91.2 grams/m2  Ao root diam: 3.2 cm  LA dimension: 2.9 cm  asc Aorta Diam: 2.6 cm  LA/Ao: 0.89  LA Volume (BP): 49.7 ml     LA Volume Index (BP): 25.9 ml/m2  RWT: 0.39     Doppler Measurements & Calculations  MV E max leonardo: 82.7 cm/sec  MV A max leonardo: 25.3 cm/sec  MV E/A: 3.3  MV dec slope: 241.1 cm/sec2  PA acc time: 0.20 sec  TR max leonardo: 177.4  cm/sec  TR max P.6 mmHg  E/E' av.2  Lateral E/e': 4.5     Medial E/e': 7.9     ______________________________________________________________________________  Report approved by: MD Deshawn Martinez 2022 02:48 PM              Thank you for allowing me to participate in the care of your patient.      Sincerely,     Aguilar Bloom MD     [facility]  cc:   ELY Cunningham Ra CNP  [address] [place] [address]

## 2022-04-01 NOTE — PATIENT INSTRUCTIONS
April 1, 2022    Thank you for allowing our Cardiology team to participate in your care.     Please note the following changes to your heart treatment plan:     Medication changes:   - continue adequate hydration     Tests to be done:  - none    Follow up:  - Follow up as needed.      Please contact our team at [phone] or [phone] for any questions or concerns.   For scheduling, please call [phone].  If you are having a medical emergency, please call [phone].     Sincerely,    Aguilar Bloom MD, FACC  Cardiology    [facility]

## 2022-07-24 ENCOUNTER — HEALTH MAINTENANCE LETTER (OUTPATIENT)
Age: 22
End: 2022-07-24

## 2022-08-16 ENCOUNTER — VIRTUAL VISIT (OUTPATIENT)
Dept: FAMILY MEDICINE | Facility: CLINIC | Age: 22
End: 2022-08-16
Payer: COMMERCIAL

## 2022-08-16 DIAGNOSIS — Z11.3 SCREENING FOR STDS (SEXUALLY TRANSMITTED DISEASES): ICD-10-CM

## 2022-08-16 DIAGNOSIS — Z30.41 ENCOUNTER FOR SURVEILLANCE OF CONTRACEPTIVE PILLS: Primary | ICD-10-CM

## 2022-08-16 PROCEDURE — 99213 OFFICE O/P EST LOW 20 MIN: CPT | Mod: 95 | Performed by: PHYSICIAN ASSISTANT

## 2022-08-16 RX ORDER — NORETHINDRONE ACETATE AND ETHINYL ESTRADIOL .02; 1 MG/1; MG/1
1 TABLET ORAL DAILY
Qty: 84 TABLET | Refills: 3 | Status: SHIPPED | OUTPATIENT
Start: 2022-08-16 | End: 2023-09-27

## 2022-08-16 NOTE — PROGRESS NOTES
Laura is a 21 year old who is being evaluated via a billable video visit.      How would you like to obtain your AVS? MyChart  If the video visit is dropped, the invitation should be resent by: Text to cell phone: [phone]  Will anyone else be joining your video visit? No    Assessment & Plan     Encounter for surveillance of contraceptive pills  Doing well, no concerns. No contraindications. Refills given.  - norethindrone-ethinyl estradiol (MICROGESTIN 1/20) 1-20 MG-MCG tablet; Take 1 tablet by mouth daily    Screening for STDs (sexually transmitted diseases)  - Chlamydia & Gonorrhea by PCR, [facility] - Clinic Collect; Future    Return in about 6 months (around 2/16/2023) for Preventive Physical Exam.    Bibiana Palacio PA-C  [facility]    Kimberly Sanchez is a 21 year old, presenting for the following health issues:  Recheck Medication (Birth control)      History of Present Illness       Reason for visit:  Refill on birth control, doctor authroization required    She eats 2-3 servings of fruits and vegetables daily.She consumes 1 sweetened beverage(s) daily.She exercises with enough effort to increase her heart rate 60 or more minutes per day.  She exercises with enough effort to increase her heart rate 5 days per week.   She is taking medications regularly.       Medication Followup of Microgestin 1/20 mcg    Taking Medication as prescribed: yes    Side Effects:  None    Medication Helping Symptoms:  yes    Started birth control pill about 1 year ago. Doing well. No side effects or concerns. Having regular periods, not as heavy as before the birth control which is nice. LMP 3 weeks ago.     She is sexually active, uses condoms. No concern for STI. Non-smoker, no migraine with aura, no family or personal history of blood clotting disorder. No HTN.     Attending [employer] and will be starting her angelina year next week. She has been working at [employer] over the summer. Excited  for school to start.    Had a few episodes of syncope and was seen by cardiology in April, thought consistent with vasovagal syncope. Cardiac work-up was been normal, including ECG, Zio patch monitor, and echocardiogram. Has not had ongoing issues.    Review of Systems   Constitutional, HEENT, cardiovascular, pulmonary, gi and gu systems are negative, except as otherwise noted.      Objective           Vitals:  No vitals were obtained today due to virtual visit.    Physical Exam   GENERAL: Healthy, alert and no distress  EYES: Eyes grossly normal to inspection.  No discharge or erythema, or obvious scleral/conjunctival abnormalities.  RESP: No audible wheeze, cough, or visible cyanosis.  No visible retractions or increased work of breathing.    SKIN: Visible skin clear. No significant rash, abnormal pigmentation or lesions.  NEURO: Cranial nerves grossly intact.  Mentation and speech appropriate for age.  PSYCH: Mentation appears normal, affect normal/bright, judgement and insight intact, normal speech and appearance well-groomed.    Video-Visit Details    Video Start Time: 3:04 PM    Type of service:  Video Visit    Video End Time:3:10 PM    Originating Location (pt. Location): Home    Distant Location (provider location):  [facility] [employer]     Platform used for Video Visit: Kwicr    .  ..

## 2022-10-03 ENCOUNTER — HEALTH MAINTENANCE LETTER (OUTPATIENT)
Age: 22
End: 2022-10-03

## 2023-01-09 ENCOUNTER — MYC REFILL (OUTPATIENT)
Dept: FAMILY MEDICINE | Facility: CLINIC | Age: 23
End: 2023-01-09

## 2023-01-09 DIAGNOSIS — B00.1 COLD SORE: ICD-10-CM

## 2023-01-11 RX ORDER — VALACYCLOVIR HYDROCHLORIDE 1 G/1
2000 TABLET, FILM COATED ORAL 2 TIMES DAILY
Qty: 4 TABLET | Refills: 0 | Status: SHIPPED | OUTPATIENT
Start: 2023-01-11 | End: 2024-04-13

## 2023-01-11 NOTE — TELEPHONE ENCOUNTER
Routing request to provider for review/approval because:  Labs not current:  creatinine    Visit is up to date. RN will issue one time 90 day destiny refill. Please advise on labs.   Kane TUBBS RN 1/11/2023 at 3:07 PM

## 2023-02-09 ENCOUNTER — TELEPHONE (OUTPATIENT)
Dept: FAMILY MEDICINE | Facility: CLINIC | Age: 23
End: 2023-02-09

## 2023-02-09 NOTE — LETTER
[facility]  [address] [address] [phone]       May 25, 2023    Laura Blair  [address] [place] [id number]    Dear Laura,    We care about your health and have reviewed your health plan and are making recommendations based on this review, to optimize your health.    You are in particular need of attention regarding:  -Cervical Cancer Screening  -Wellness (Physical) Visit   -Chlamydia Screening    We are recommending that you:  -schedule a WELLNESS (Physical) APPOINTMENT with me.   I will check fasting labs the same day - nothing to eat except water and meds for 8-10 hours prior.      -schedule a PAP SMEAR EXAM which is due.  Please disregard this reminder if you have had this exam elsewhere within the last year.  It would be helpful for us to have a copy of your recent pap smear report in our file so that we can best coordinate your care.    If you are under/uninsured, we recommend you contact the [facility]. They offer pap smears at no charge or on a sliding fee charge. You can schedule with them at [phone]. Please have them send us the results.    -Be tested for Chlamydia      Annual testing is recommended for sexually active women between the ages of 15 and 25.     Chlamydia is the most common bacterial sexually transmitted disease in the [place], according to the Centers for Disease Control (CDC), yet many women considered at risk for the disease do not get the recommended annual screening test.     Chlamydia has no symptoms and left untreated, it can cause infertility and other serious health problems. Chlamydia is easily cured with antibiotics.  We have enclosed a brochure that gives you additional information about Chlamydia.    Testing is now usually done by leaving a urine sample with a lab-only appointment or you can make an office visit if you have other concerns. (If you are not recently or currently  sexually active, then please contact us so we can update your medical record.)      In addition, here is a list of due or overdue Health Maintenance reminders.    Health Maintenance Due   Topic Date Due    Discuss Advance Care Planning  Never done    COVID-19 Vaccine (4 - Moderna series) 04/01/2022    Yearly Preventive Visit  06/04/2022    Chlamydia Screening  06/04/2022    Flu Vaccine (1) 09/01/2022    PAP Smear  Never done    PHQ-2 (once per calendar year)  01/01/2023    Diptheria Tetanus Pertussis (DTAP/TDAP/TD) Vaccine (7 - Td or Tdap) 03/07/2023       To address the above recommendations, we encourage you to contact us at [phone], via [employer] or by contacting Central Scheduling toll free at [phone] 24 hours a day. They will assist you with finding the most convenient time and location.    Thank you for trusting [facility] and we appreciate the opportunity to serve you.  We look forward to supporting your healthcare needs in the future.    Healthy Regards,    Your [facility] Team

## 2023-02-09 NOTE — LETTER
[facility]  [address] [address] [phone]       February 24, 2023    Laura Blair  [address] [place] [id number]    Dear Laura,    We care about your health and have reviewed your health plan and are making recommendations based on this review, to optimize your health.    You are in particular need of attention regarding:  -Cervical Cancer Screening  -Wellness (Physical) Visit   -Chlamydia Screening    We are recommending that you:  -schedule a WELLNESS (Physical) APPOINTMENT with me.   I will check fasting labs the same day - nothing to eat except water and meds for 8-10 hours prior.      -schedule a PAP SMEAR EXAM which is due.  Please disregard this reminder if you have had this exam elsewhere within the last year.  It would be helpful for us to have a copy of your recent pap smear report in our file so that we can best coordinate your care.    If you are under/uninsured, we recommend you contact the [facility]. They offer pap smears at no charge or on a sliding fee charge. You can schedule with them at [phone]. Please have them send us the results.    -Be tested for Chlamydia      Annual testing is recommended for sexually active women between the ages of 15 and 25.     Chlamydia is the most common bacterial sexually transmitted disease in the [place], according to the Centers for Disease Control (CDC), yet many women considered at risk for the disease do not get the recommended annual screening test.     Chlamydia has no symptoms and left untreated, it can cause infertility and other serious health problems. Chlamydia is easily cured with antibiotics.  We have enclosed a brochure that gives you additional information about Chlamydia.    Testing is now usually done by leaving a urine sample with a lab-only appointment or you can make an office visit if you have other concerns. (If you are not recently or currently  sexually active, then please contact us so we can update your medical record.)          In addition, here is a list of due or overdue Health Maintenance reminders.    Health Maintenance Due   Topic Date Due     Discuss Advance Care Planning  Never done     COVID-19 Vaccine (4 - Booster for Moderna series) 04/01/2022     Yearly Preventive Visit  06/04/2022     Chlamydia Screening  06/04/2022     Flu Vaccine (1) 09/01/2022     PAP Smear  Never done     PHQ-2 (once per calendar year)  01/01/2023     Diptheria Tetanus Pertussis (DTAP/TDAP/TD) Vaccine (7 - Td or Tdap) 03/07/2023       To address the above recommendations, we encourage you to contact us at [phone], via [employer] or by contacting Central Scheduling toll free at [phone] 24 hours a day. They will assist you with finding the most convenient time and location.    Thank you for trusting [facility] and we appreciate the opportunity to serve you.  We look forward to supporting your healthcare needs in the future.    Healthy Regards,    Your [facility] Team

## 2023-02-09 NOTE — CONFIDENTIAL NOTE
Patient Quality Outreach    Patient is due for the following:   Physical Preventive Adult Physical  Chlamydia Screening    Next Steps:   Schedule a Adult Preventative    Type of outreach:    Sent [employer] message.      Questions for provider review:    None     Bashir Garza MA

## 2023-02-24 NOTE — CONFIDENTIAL NOTE
Patient Quality Outreach    Patient is due for the following:   Cervical Cancer Screening - PAP Needed  Physical Preventive Adult Physical  Chlamydia Screening    Next Steps:   Schedule a Adult Preventative    Type of outreach:    Sent letter.    Next Steps:  Reach out within 90 days via Letter.    Max number of attempts reached: Yes. Will try again in 90 days if patient still on fail list.    Questions for provider review:    None     Bashir Garza MA

## 2023-05-25 NOTE — CONFIDENTIAL NOTE
Patient Quality Outreach    Patient is due for the following:   Cervical Cancer Screening - PAP Needed  Physical Preventive Adult Physical  Chlamydia Screening    Next Steps:   Schedule a Adult Preventative    Type of outreach:    Sent letter.      Questions for provider review:    None           Bashir Garza MA               DISPLAY PLAN FREE TEXT

## 2023-08-12 ENCOUNTER — HEALTH MAINTENANCE LETTER (OUTPATIENT)
Age: 23
End: 2023-08-12

## 2023-09-26 DIAGNOSIS — Z30.41 ENCOUNTER FOR SURVEILLANCE OF CONTRACEPTIVE PILLS: ICD-10-CM

## 2023-09-27 RX ORDER — NORETHINDRONE ACETATE AND ETHINYL ESTRADIOL .02; 1 MG/1; MG/1
1 TABLET ORAL DAILY
Qty: 84 TABLET | Refills: 0 | Status: SHIPPED | OUTPATIENT
Start: 2023-09-27 | End: 2024-01-19

## 2023-09-27 NOTE — TELEPHONE ENCOUNTER
Medication is being filled for 1 time refill only due to:  Patient needs to be seen because it has been more than one year since last visit.  Sent [employer] to schedule annual exam.  Genesis Casillas RN

## 2024-01-19 DIAGNOSIS — Z30.41 ENCOUNTER FOR SURVEILLANCE OF CONTRACEPTIVE PILLS: ICD-10-CM

## 2024-01-19 RX ORDER — NORETHINDRONE ACETATE AND ETHINYL ESTRADIOL .02; 1 MG/1; MG/1
1 TABLET ORAL DAILY
Qty: 84 TABLET | Refills: 0 | Status: SHIPPED | OUTPATIENT
Start: 2024-01-19 | End: 2024-02-09

## 2024-02-08 ASSESSMENT — ANXIETY QUESTIONNAIRES
8. IF YOU CHECKED OFF ANY PROBLEMS, HOW DIFFICULT HAVE THESE MADE IT FOR YOU TO DO YOUR WORK, TAKE CARE OF THINGS AT HOME, OR GET ALONG WITH OTHER PEOPLE?: SOMEWHAT DIFFICULT
GAD7 TOTAL SCORE: 13
5. BEING SO RESTLESS THAT IT IS HARD TO SIT STILL: SEVERAL DAYS
2. NOT BEING ABLE TO STOP OR CONTROL WORRYING: MORE THAN HALF THE DAYS
7. FEELING AFRAID AS IF SOMETHING AWFUL MIGHT HAPPEN: SEVERAL DAYS
4. TROUBLE RELAXING: MORE THAN HALF THE DAYS
GAD7 TOTAL SCORE: 13
GAD7 TOTAL SCORE: 13
7. FEELING AFRAID AS IF SOMETHING AWFUL MIGHT HAPPEN: SEVERAL DAYS
3. WORRYING TOO MUCH ABOUT DIFFERENT THINGS: MORE THAN HALF THE DAYS
1. FEELING NERVOUS, ANXIOUS, OR ON EDGE: MORE THAN HALF THE DAYS
IF YOU CHECKED OFF ANY PROBLEMS ON THIS QUESTIONNAIRE, HOW DIFFICULT HAVE THESE PROBLEMS MADE IT FOR YOU TO DO YOUR WORK, TAKE CARE OF THINGS AT HOME, OR GET ALONG WITH OTHER PEOPLE: SOMEWHAT DIFFICULT
6. BECOMING EASILY ANNOYED OR IRRITABLE: NEARLY EVERY DAY

## 2024-02-08 ASSESSMENT — PATIENT HEALTH QUESTIONNAIRE - PHQ9
SUM OF ALL RESPONSES TO PHQ QUESTIONS 1-9: 14
SUM OF ALL RESPONSES TO PHQ QUESTIONS 1-9: 14
10. IF YOU CHECKED OFF ANY PROBLEMS, HOW DIFFICULT HAVE THESE PROBLEMS MADE IT FOR YOU TO DO YOUR WORK, TAKE CARE OF THINGS AT HOME, OR GET ALONG WITH OTHER PEOPLE: SOMEWHAT DIFFICULT

## 2024-02-08 NOTE — PROGRESS NOTES
"Answers submitted by the patient for this visit:  Patient Health Questionnaire (Submitted on 2/8/2024)  If you checked off any problems, how difficult have these problems made it for you to do your work, take care of things at home, or get along with other people?: Somewhat difficult  PHQ9 TOTAL SCORE: 14  LELIA-7 (Submitted on 2/8/2024)  LELIA 7 TOTAL SCORE: 13  .Laura is a 23 year old No obstetric history on file. female who presents for annual exam.     Besides routine health maintenance,  she would like to discuss heavy periods.  HPI: Laura states she hasn't had an annual exam in a couple of years. Her periods have been much heavier and more painful since December. Agreeable to a pelvic ultrasound to try to identify any cause. We will also do a CBC with iron studies due to how heavy the flow is. Open to a mental health referral as she is feeling significant stress with school. Declines medications at this time. Does have a history of self cutting but reports it has been over a year since she had done that. Agreeable to STI testing today. Would like a renewal on her JOSI's. First pap smear collected today.   Menses are regular q 28-30 days and heavy lasting 5-6 and has increased to 9 days.   Menses flow: heavy since [place].  Clots about a quarter   Patient's last menstrual period was 01/26/2024..   Using oral contraceptives for contraception.    She is not currently considering pregnancy.    REPRODUCTIVE/GYNECOLOGIC HISTORY:  Laura is not sexually active with male partner(s) and is not currently in monogamous relationship.   STI testing offered?  Accepted  History of abnormal Pap smear:  No  SOCIAL HISTORY  Abuse: does not report having previously been physical or sexually abused.    Do you feel safe in your environment? YES       She  reports that she has never smoked. She has never used smokeless tobacco. She used to vape but has stopped that. Will smoke mariajuana \"a couple of hits\" every other day. " Encouraged to consider edibles if she does not plan to stop to reduce damage to her lungs.        Last PHQ-9 score on record =       2024    12:25 PM   PHQ-9 SCORE   PHQ-9 Total Score MyChart 14 (Moderate depression)   PHQ-9 Total Score 14     Last GAD7 score on record =       2024    12:26 PM   LELIA-7 SCORE   Total Score 13 (moderate anxiety)   Total Score 13     Alcohol Score = 1-2 x per month     HEALTH MAINTENANCE:       Never done ADVANCE CARE PLANNING (Every 5 Years)       Never done HIV SCREENING (Once)       Never done HEPATITIS C SCREENING (Once)                       MAR 7   2023 DTAP/TDAP/TD IMMUNIZATION (7 - Td or Tdap)  Last completed: Mar 7, 2013     SEP 1   2023 INFLUENZA VACCINE (1)  Last completed: 2022     SEP 1   2023 COVID-19 Vaccine ( season)  Last completed: 2022           HEALTHY HABITS  Eating habits: 2 meals with snacking.   Calcium/Vitamin D intake: source:  dairy  Exercise: How often do you exercise? 3-5 times/week;Walking and Strength Training  Have you had an eye exam in the last two years? No  Do you routinely see the dentist once or twice yearly? NO      HISTORY:  OB History    Para Term  AB Living   0 0 0 0 0 0   SAB IAB Ectopic Multiple Live Births   0 0 0 0 0     Past Medical History:   Diagnosis Date    Vasovagal syncope 2022     History reviewed. No pertinent surgical history.  Family History   Problem Relation Age of Onset    Diabetes Father     Diabetes Paternal Grandmother     Heart Disease Paternal Grandmother     Diabetes Paternal Grandfather     No Known Problems Mother      Social History     Socioeconomic History    Marital status: Single   Tobacco Use    Smoking status: Never    Smokeless tobacco: Never   Vaping Use    Vaping Use: Never used   Substance and Sexual Activity    Alcohol use: Yes     Comment: social    Drug use: Never    Sexual activity: Yes     Partners: Male     Birth control/protection: Pill       Current  "Outpatient Medications:     norethindrone-ethinyl estradiol (MICROGESTIN 1/20) 1-20 MG-MCG tablet, TAKE 1 TABLET BY MOUTH DAILY, Disp: 84 tablet, Rfl: 0    valACYclovir (VALTREX) 1000 mg tablet, Take 2 tablets (2,000 mg) by mouth 2 times daily, Disp: 4 tablet, Rfl: 0     Allergies   Allergen Reactions    Zithromax [Azithromycin]      Nausea/vomiting        Past medical, surgical, social and family history were reviewed and updated in EPIC.    ROS:   12 point review of systems negative other than symptoms noted below or in the HPI.    PHYSICAL EXAM:  /66   Ht 1.689 m (5' 6.5\")   Wt 92.1 kg (203 lb)   LMP 01/26/2024   BMI 32.27 kg/m     BMI: Body mass index is 32.27 kg/m .  Constitutional: healthy, alert and no distress  Neck: symmetrical, thyroid normal size, no masses present, no lymphadenopathy present.   Cardiovascular: RRR, no murmurs present  Respiratory: breathing unlabored, lungs CTA bilaterally  Breast:normal without masses, tenderness or nipple discharge and no palpable axillary masses or adenopathy  Gastrointestinal: abdomen soft, non-tender, bowel sounds present  PELVIC EXAM:  Vulva: No lesions, no adenopathy, BUS WNL  Vagina: Moist, pink, discharge normal  well rugated, no lesions. GC/Chlam collected  Cervix:smooth, pink, no visible lesions. Pap smear collected.   Uterus: Normal size, non-tender, mobile  Ovaries: No masses palpated  Rectal exam: deferred    ASSESSMENT/PLAN:    ICD-10-CM    1. Encounter for surveillance of contraceptive pills  Z30.41         No results found for any visits on 02/09/24.      COUNSELING:   Reviewed preventive health counseling, as reflected in patient instructions       Regular exercise       Healthy diet/nutrition       Vision screening       Immunizations  Vaccinated for: Covid-19 and Influenza           Alcohol Use       Contraception       Osteoporosis prevention/bone health       Safe sex practices/STD prevention       Mental health referral    Violet Garcia, " CNM

## 2024-02-09 ENCOUNTER — ANCILLARY PROCEDURE (OUTPATIENT)
Dept: ULTRASOUND IMAGING | Facility: CLINIC | Age: 24
End: 2024-02-09
Attending: ADVANCED PRACTICE MIDWIFE
Payer: COMMERCIAL

## 2024-02-09 ENCOUNTER — OFFICE VISIT (OUTPATIENT)
Dept: MIDWIFE SERVICES | Facility: CLINIC | Age: 24
End: 2024-02-09
Payer: COMMERCIAL

## 2024-02-09 VITALS
HEIGHT: 67 IN | BODY MASS INDEX: 31.86 KG/M2 | DIASTOLIC BLOOD PRESSURE: 66 MMHG | WEIGHT: 203 LBS | SYSTOLIC BLOOD PRESSURE: 114 MMHG

## 2024-02-09 DIAGNOSIS — Z11.3 ROUTINE SCREENING FOR STI (SEXUALLY TRANSMITTED INFECTION): ICD-10-CM

## 2024-02-09 DIAGNOSIS — Z30.41 ENCOUNTER FOR SURVEILLANCE OF CONTRACEPTIVE PILLS: ICD-10-CM

## 2024-02-09 DIAGNOSIS — Z12.4 ROUTINE PAPANICOLAOU SMEAR: ICD-10-CM

## 2024-02-09 DIAGNOSIS — N92.0 MENORRHAGIA WITH REGULAR CYCLE: ICD-10-CM

## 2024-02-09 DIAGNOSIS — Z01.419 WELL WOMAN EXAM: Primary | ICD-10-CM

## 2024-02-09 LAB
ERYTHROCYTE [DISTWIDTH] IN BLOOD BY AUTOMATED COUNT: 12.6 % (ref 10–15)
HCT VFR BLD AUTO: 40.5 % (ref 35–47)
HGB BLD-MCNC: 13.7 G/DL (ref 11.7–15.7)
MCH RBC QN AUTO: 29.2 PG (ref 26.5–33)
MCHC RBC AUTO-ENTMCNC: 33.8 G/DL (ref 31.5–36.5)
MCV RBC AUTO: 86 FL (ref 78–100)
PLATELET # BLD AUTO: 306 10E3/UL (ref 150–450)
RBC # BLD AUTO: 4.69 10E6/UL (ref 3.8–5.2)
WBC # BLD AUTO: 6.2 10E3/UL (ref 4–11)

## 2024-02-09 PROCEDURE — 83540 ASSAY OF IRON: CPT | Performed by: ADVANCED PRACTICE MIDWIFE

## 2024-02-09 PROCEDURE — 76856 US EXAM PELVIC COMPLETE: CPT | Performed by: OBSTETRICS & GYNECOLOGY

## 2024-02-09 PROCEDURE — 87591 N.GONORRHOEAE DNA AMP PROB: CPT | Performed by: ADVANCED PRACTICE MIDWIFE

## 2024-02-09 PROCEDURE — 87491 CHLMYD TRACH DNA AMP PROBE: CPT | Performed by: ADVANCED PRACTICE MIDWIFE

## 2024-02-09 PROCEDURE — 86803 HEPATITIS C AB TEST: CPT | Performed by: ADVANCED PRACTICE MIDWIFE

## 2024-02-09 PROCEDURE — G0145 SCR C/V CYTO,THINLAYER,RESCR: HCPCS | Performed by: ADVANCED PRACTICE MIDWIFE

## 2024-02-09 PROCEDURE — 76830 TRANSVAGINAL US NON-OB: CPT | Performed by: OBSTETRICS & GYNECOLOGY

## 2024-02-09 PROCEDURE — 87389 HIV-1 AG W/HIV-1&-2 AB AG IA: CPT | Performed by: ADVANCED PRACTICE MIDWIFE

## 2024-02-09 PROCEDURE — 90686 IIV4 VACC NO PRSV 0.5 ML IM: CPT | Performed by: ADVANCED PRACTICE MIDWIFE

## 2024-02-09 PROCEDURE — 99213 OFFICE O/P EST LOW 20 MIN: CPT | Mod: 25 | Performed by: ADVANCED PRACTICE MIDWIFE

## 2024-02-09 PROCEDURE — 85027 COMPLETE CBC AUTOMATED: CPT | Performed by: ADVANCED PRACTICE MIDWIFE

## 2024-02-09 PROCEDURE — 90471 IMMUNIZATION ADMIN: CPT | Performed by: ADVANCED PRACTICE MIDWIFE

## 2024-02-09 PROCEDURE — 99385 PREV VISIT NEW AGE 18-39: CPT | Mod: 25 | Performed by: ADVANCED PRACTICE MIDWIFE

## 2024-02-09 PROCEDURE — 91320 SARSCV2 VAC 30MCG TRS-SUC IM: CPT | Performed by: ADVANCED PRACTICE MIDWIFE

## 2024-02-09 PROCEDURE — 83550 IRON BINDING TEST: CPT | Performed by: ADVANCED PRACTICE MIDWIFE

## 2024-02-09 PROCEDURE — 82728 ASSAY OF FERRITIN: CPT | Performed by: ADVANCED PRACTICE MIDWIFE

## 2024-02-09 PROCEDURE — 90480 ADMN SARSCOV2 VAC 1/ONLY CMP: CPT | Performed by: ADVANCED PRACTICE MIDWIFE

## 2024-02-09 PROCEDURE — 36415 COLL VENOUS BLD VENIPUNCTURE: CPT | Performed by: ADVANCED PRACTICE MIDWIFE

## 2024-02-09 RX ORDER — NORETHINDRONE ACETATE AND ETHINYL ESTRADIOL .02; 1 MG/1; MG/1
1 TABLET ORAL DAILY
Qty: 84 TABLET | Refills: 3 | Status: SHIPPED | OUTPATIENT
Start: 2024-02-09 | End: 2024-04-04

## 2024-02-09 NOTE — NURSING NOTE
"Chief Complaint   Patient presents with    Physical       Initial /66   Ht 1.689 m (5' 6.5\")   Wt 92.1 kg (203 lb)   LMP 2024   BMI 32.27 kg/m   Estimated body mass index is 32.27 kg/m  as calculated from the following:    Height as of this encounter: 1.689 m (5' 6.5\").    Weight as of this encounter: 92.1 kg (203 lb).  BP completed using cuff size: regular    Questioned patient about current smoking habits.  Pt. has never smoked.          The following HM Due: pap smear  Sonia (13-24)    Discuss periods: Heavy    Osiris Lutz 2024 at 11:09 AM    "

## 2024-02-10 LAB
C TRACH DNA SPEC QL PROBE+SIG AMP: NEGATIVE
FERRITIN SERPL-MCNC: 31 NG/ML (ref 6–175)
HCV AB SERPL QL IA: NONREACTIVE
HIV 1+2 AB+HIV1 P24 AG SERPL QL IA: NONREACTIVE
IRON BINDING CAPACITY (ROCHE): 333 UG/DL (ref 240–430)
IRON SATN MFR SERPL: 11 % (ref 15–46)
IRON SERPL-MCNC: 38 UG/DL (ref 37–145)
N GONORRHOEA DNA SPEC QL NAA+PROBE: NEGATIVE

## 2024-02-12 DIAGNOSIS — N83.201 CYST OF RIGHT OVARY: Primary | ICD-10-CM

## 2024-02-13 LAB
BKR LAB AP GYN ADEQUACY: NORMAL
BKR LAB AP GYN INTERPRETATION: NORMAL
BKR LAB AP HPV REFLEX: NO
BKR LAB AP PREVIOUS ABNORMAL: NORMAL
PATH REPORT.COMMENTS IMP SPEC: NORMAL
PATH REPORT.COMMENTS IMP SPEC: NORMAL
PATH REPORT.RELEVANT HX SPEC: NORMAL

## 2024-04-02 ENCOUNTER — MYC REFILL (OUTPATIENT)
Dept: FAMILY MEDICINE | Facility: CLINIC | Age: 24
End: 2024-04-02
Payer: COMMERCIAL

## 2024-04-02 DIAGNOSIS — B00.1 COLD SORE: ICD-10-CM

## 2024-04-02 NOTE — LETTER
April 4, 2024      Laura Blair  [address]  [place] [address]        Eben Sanchez,     We recently received a call from your pharmacy requesting a refill request for your medication. We left a message at [phone] to notify you that you are due for an OFFICE VISIT for further refills.      We have authorized a one time refill of your medication to allow time for you to schedule your appointment.      Please call [phone] to schedule an appointment or if you have MyChart you can schedule with your provider as well.      Taking care of your health is important to us, and ongoing visits with your provider are vital to your care. We look forward to seeing you in the near future.      Thank you for using [facility] for your medical needs.     Sincerely,        ELY Cunningham Ra CNP

## 2024-04-03 RX ORDER — VALACYCLOVIR HYDROCHLORIDE 1 G/1
2000 TABLET, FILM COATED ORAL 2 TIMES DAILY
Qty: 4 TABLET | Refills: 0 | OUTPATIENT
Start: 2024-04-03

## 2024-04-03 NOTE — TELEPHONE ENCOUNTER
I have not seen her since 8/2022 and I have not prescribed this for her before. Needs visit for further refills.    Bibiana Palacio PA-C

## 2024-04-04 ENCOUNTER — MYC MEDICAL ADVICE (OUTPATIENT)
Dept: FAMILY MEDICINE | Facility: CLINIC | Age: 24
End: 2024-04-04
Payer: COMMERCIAL

## 2024-04-04 ENCOUNTER — MYC REFILL (OUTPATIENT)
Dept: MIDWIFE SERVICES | Facility: CLINIC | Age: 24
End: 2024-04-04
Payer: COMMERCIAL

## 2024-04-04 DIAGNOSIS — Z30.41 ENCOUNTER FOR SURVEILLANCE OF CONTRACEPTIVE PILLS: ICD-10-CM

## 2024-04-04 NOTE — TELEPHONE ENCOUNTER
Sent [employer] message requesting a call back for an appt. One more attempt will be made.     Kena Fleming  Lead   [facility]

## 2024-04-04 NOTE — TELEPHONE ENCOUNTER
ALEXANDERM requesting a call back for an appt. Two more attempts will be made.    Kena Fleming  Lead   [facility]

## 2024-04-05 RX ORDER — NORETHINDRONE ACETATE AND ETHINYL ESTRADIOL .02; 1 MG/1; MG/1
1 TABLET ORAL DAILY
Qty: 84 TABLET | Refills: 3 | Status: SHIPPED | OUTPATIENT
Start: 2024-04-05 | End: 2024-09-18

## 2024-04-13 DIAGNOSIS — B00.1 COLD SORE: ICD-10-CM

## 2024-04-13 RX ORDER — VALACYCLOVIR HYDROCHLORIDE 1 G/1
2000 TABLET, FILM COATED ORAL 2 TIMES DAILY
Qty: 4 TABLET | Refills: 5 | Status: SHIPPED | OUTPATIENT
Start: 2024-04-13

## 2024-04-16 ENCOUNTER — ANCILLARY PROCEDURE (OUTPATIENT)
Dept: ULTRASOUND IMAGING | Facility: CLINIC | Age: 24
End: 2024-04-16
Attending: ADVANCED PRACTICE MIDWIFE
Payer: COMMERCIAL

## 2024-04-16 DIAGNOSIS — N83.201 CYST OF RIGHT OVARY: ICD-10-CM

## 2024-04-16 PROCEDURE — 76830 TRANSVAGINAL US NON-OB: CPT | Performed by: OBSTETRICS & GYNECOLOGY

## 2024-04-16 PROCEDURE — 76856 US EXAM PELVIC COMPLETE: CPT | Performed by: OBSTETRICS & GYNECOLOGY

## 2024-08-27 ENCOUNTER — NURSE TRIAGE (OUTPATIENT)
Dept: FAMILY MEDICINE | Facility: CLINIC | Age: 24
End: 2024-08-27
Payer: COMMERCIAL

## 2024-08-27 NOTE — TELEPHONE ENCOUNTER
Plastic bucket fell on nose last night, nose bleed for about 10-15 minutes.  Nose a little black and blue on bridge of nose.  Laceration on bridge is 1/2-1 inch, superficial.  Tender to touch.  Bucket fell 2-3 feet before hitting nose.    Denies headache, denies loss of consciousness, denies dizziness, vomiting/nausea, light or noise sensitivity, sleep disturbances.  Speech is clear.      Per protocol, pt to be seen in office today or tomorrow.  Scheduled appt for 8/28/24 in .  Care advise given.  Pt instructed to call back for new of worsening symptoms.  Pt verbalized understanding and agreeable to plan.    Magdalena Marley RN, BSN  [facility]    Reason for Disposition   Last tetanus shot > 10 years ago and CLEAN cut or scrape    Additional Information   Negative: ACUTE NEUROLOGIC SYMPTOM and symptom present now   Negative: Knocked out (unconscious) > 1 minute   Negative: Seizure (convulsion) occurred  (Exception: Prior history of seizures and now alert and without Acute Neurologic Symptoms.)   Negative: Neck pain after dangerous injury (e.g., MVA, diving, trampoline, contact sports, fall > 10 feet or 3 meters)  (Exception: Neck pain began > 1 hour after injury.)   Negative: Major bleeding (actively dripping or spurting) that can't be stopped   Negative: Penetrating head injury (e.g., knife, gunshot wound, metal object)   Negative: Sounds like a life-threatening emergency to the triager   Negative: Can't remember what happened (amnesia)   Negative: Vomiting once or more   Negative: Watery or blood-tinged fluid dripping from the nose or ears   Negative: ACUTE NEUROLOGIC SYMPTOM and now fine   Negative: Knocked out (unconscious) < 1 minute and now fine   Negative: SEVERE headache   Negative: Dangerous injury (e.g., MVA, diving, trampoline, contact sports, fall > 10 feet or 3 meters) or severe blow from hard object (e.g., golf club or baseball bat)   Negative: Large swelling or bruise (> 2  "inches or 5 cm)   Negative: Skin is split open or gaping (length > 1/2 inch or 12 mm)   Negative: Bleeding won't stop after 10 minutes of direct pressure (using correct technique)   Negative: One or two 'black eyes' (bruising, purple color of eyelids), and onset within 24 hours of head injury   Negative: Taking Coumadin (warfarin) or other strong blood thinner, or known bleeding disorder (e.g., thrombocytopenia)   Negative: Age over 65 years with an area of head swelling or bruise   Negative: Sounds like a serious injury to the triager    Answer Assessment - Initial Assessment Questions  1. MECHANISM: \"How did the injury happen?\" For falls, ask: \"What height did you fall from?\" and \"What surface did you fall against?\"       Lifting bucket up on to shelf, fell forward and bucket landed on bridge of nose.  Bucket fell 2-3 feet  2. ONSET: \"When did the injury happen?\" (Minutes or hours ago)       8/26/24 evening  3. NEUROLOGIC SYMPTOMS: \"Was there any loss of consciousness?\" \"Are there any other neurological symptoms?\"       Denies   4. MENTAL STATUS: \"Does the person know who they are, who you are, and where they are?\"       yes  5. LOCATION: \"What part of the head was hit?\"       Bridge of nose  6. SCALP APPEARANCE: \"What does the scalp look like? Is it bleeding now?\" If Yes, ask: \"Is it difficult to stop?\"       no  7. SIZE: For cuts, bruises, or swelling, ask: \"How large is it?\" (e.g., inches or centimeters)       Half an inch to one inch in length, mild swelling.  A little bruising  8. PAIN: \"Is there any pain?\" If Yes, ask: \"How bad is it?\"  (e.g., Scale 1-10; or mild, moderate, severe)      Mild, tender to touch  9. TETANUS: For any breaks in the skin, ask: \"When was the last tetanus booster?\"      Unsure, bucket was plastic  10. BLOOD THINNERS: \"Do you take any blood thinners?\" (e.g., aspirin, clopidogrel / Plavix, coumadin, heparin). Notes: Other strong blood thinners include: Arixtra (fondaparinux), Eliquis " "(apixaban), Pradaxa (dabigatran), and Xarelto (rivaroxaban).        denies  11. OTHER SYMPTOMS: \"Do you have any other symptoms?\" (e.g., neck pain, vomiting)        denies  12. PREGNANCY: \"Is there any chance you are pregnant?\" \"When was your last menstrual period?\"        denies    Protocols used: Head Injury-A-[place]    "

## 2024-08-28 ENCOUNTER — OFFICE VISIT (OUTPATIENT)
Dept: PEDIATRICS | Facility: CLINIC | Age: 24
End: 2024-08-28
Payer: COMMERCIAL

## 2024-08-28 VITALS
DIASTOLIC BLOOD PRESSURE: 82 MMHG | HEART RATE: 73 BPM | SYSTOLIC BLOOD PRESSURE: 130 MMHG | OXYGEN SATURATION: 99 % | WEIGHT: 196 LBS | HEIGHT: 66 IN | BODY MASS INDEX: 31.5 KG/M2 | RESPIRATION RATE: 15 BRPM | TEMPERATURE: 97.8 F

## 2024-08-28 DIAGNOSIS — S01.21XA LACERATION OF NOSE WITHOUT COMPLICATION, INITIAL ENCOUNTER: ICD-10-CM

## 2024-08-28 DIAGNOSIS — S09.92XA NASAL INJURY, INITIAL ENCOUNTER: Primary | ICD-10-CM

## 2024-08-28 PROCEDURE — G2211 COMPLEX E/M VISIT ADD ON: HCPCS | Performed by: NURSE PRACTITIONER

## 2024-08-28 PROCEDURE — 99213 OFFICE O/P EST LOW 20 MIN: CPT | Mod: 25 | Performed by: NURSE PRACTITIONER

## 2024-08-28 PROCEDURE — 90471 IMMUNIZATION ADMIN: CPT | Performed by: NURSE PRACTITIONER

## 2024-08-28 PROCEDURE — 90715 TDAP VACCINE 7 YRS/> IM: CPT | Performed by: NURSE PRACTITIONER

## 2024-08-28 ASSESSMENT — ENCOUNTER SYMPTOMS
NUMBNESS: 0
ARTHRALGIAS: 0
VERTIGO: 0
CHILLS: 0
ABDOMINAL PAIN: 0
SORE THROAT: 0
ANOREXIA: 0
WEAKNESS: 0
COUGH: 0
FATIGUE: 0
SWOLLEN GLANDS: 0
NAUSEA: 0
JOINT SWELLING: 0
MYALGIAS: 0
VISUAL CHANGE: 0
VOMITING: 0
FEVER: 0
NECK PAIN: 0
DIAPHORESIS: 0
HEADACHES: 1
CHANGE IN BOWEL HABIT: 0

## 2024-08-28 ASSESSMENT — PAIN SCALES - GENERAL: PAINLEVEL: NO PAIN (0)

## 2024-08-28 NOTE — PATIENT INSTRUCTIONS
OK to put aquaphor or vaseline on the cut  Watch for signs of infection we reviewed  Avoid sun exposure

## 2024-08-28 NOTE — PROGRESS NOTES
Assessment & Plan     Nasal injury, initial encounter  Laceration of nose without complication, initial encounter  No indication for imaging based on today's findings. S/s to watch for reviewed with pt.  Patient Instructions   OK to put aquaphor or vaseline on the cut  Watch for signs of infection we reviewed  Avoid sun exposure    Subjective   Laura is a 23 year old, presenting for the following health issues:  Facial Injury        8/28/2024    10:44 AM   Additional Questions   Roomed by Leticia STYLES   Accompanied by DARLING         8/28/2024    10:44 AM   Patient Reported Additional Medications   Patient reports taking the following new medications No     History of Present Illness       Reason for visit:  Tetnus shot and to check out bruised/cut nose  Symptom onset:  1-3 days ago  Symptoms include:  Pain to the touch, a rosi, swelling  Symptom intensity:  Mild  Symptom progression:  Improving  Had these symptoms before:  No  Prior treatment description:  No  What makes it worse:  Touching  What makes it better:  Not touching, ibuprofen and advil    She eats 0-1 servings of fruits and vegetables daily.She consumes 0 sweetened beverage(s) daily.She exercises with enough effort to increase her heart rate 60 or more minutes per day.  She exercises with enough effort to increase her heart rate 3 or less days per week.   She is taking medications regularly.    Monday night had a large bucket hit the bridge of her nose  NO LOC  Had a bloody nose to R nare , stopped after 10 minutes  No eye bruising, no ear bleeding  Can breathe through her nose  Afraid to blow her nose  No return of nose bleed  Headache initially but resolved without return of symptoms    Had swelling above eye brows, resolved with ibuprofen and ice pack  Slight dizzy yesterday, went away  Denies N/V, confusion, vision changes, current HA.            Objective    /82 (BP Location: Right arm, Patient Position: Sitting, Cuff Size: Adult Regular)   Pulse  "73   Temp 97.8  F (36.6  C) (Oral)   Resp 15   Ht 1.686 m (5' 6.38\")   Wt 88.9 kg (196 lb)   LMP 08/27/2024 (Exact Date)   SpO2 99%   BMI 31.28 kg/m    Body mass index is 31.28 kg/m .  Physical Exam   GENERAL: alert and no distress  EYES: Eyes grossly normal to inspection, PERRL and conjunctivae and sclerae normal  HENT: normal cephalic/atraumatic, ear canals and TM's normal, oropharynx clear, oral mucous membranes moist, and nose: no septal hematoma, no nose bleed, nasal bridge straight, small laceration as noted with localized tenderness over this as seen in photo below is well approximated without erythema, swelling, or drainage.               Signed Electronically by: Lesly Brady NP    "

## 2024-09-18 ENCOUNTER — VIRTUAL VISIT (OUTPATIENT)
Dept: FAMILY MEDICINE | Facility: CLINIC | Age: 24
End: 2024-09-18
Payer: COMMERCIAL

## 2024-09-18 DIAGNOSIS — Z30.41 ENCOUNTER FOR SURVEILLANCE OF CONTRACEPTIVE PILLS: Primary | ICD-10-CM

## 2024-09-18 DIAGNOSIS — N92.1 BREAKTHROUGH BLEEDING ON BIRTH CONTROL PILLS: ICD-10-CM

## 2024-09-18 PROCEDURE — 99214 OFFICE O/P EST MOD 30 MIN: CPT | Mod: 95 | Performed by: PHYSICIAN ASSISTANT

## 2024-09-18 RX ORDER — NORETHINDRONE ACETATE AND ETHINYL ESTRADIOL .03; 1.5 MG/1; MG/1
1 TABLET ORAL DAILY
Qty: 84 TABLET | Refills: 4 | Status: SHIPPED | OUTPATIENT
Start: 2024-09-18

## 2024-09-18 NOTE — PROGRESS NOTES
Laura is a 23 year old who is being evaluated via a billable video visit.    How would you like to obtain your AVS? MyChart  If the video visit is dropped, the invitation should be resent by: Text to cell phone: [phone]  Will anyone else be joining your video visit? No      Assessment & Plan     Encounter for surveillance of contraceptive pills  Breakthrough bleeding on birth control pills  Will try increasing dose of JOSI to see if she has improvement in heavy, painful periods as well as breakthrough bleeding. She has no contraindications. If doing well after a few months she could take continually if she would like to have a period every 3 months instead of monthly. If no change in bleeding after 2-3 months, follow-up for further evaluation. She had normal thyroid testing in 2021 but would consider checking thyroid labs if still irregular bleeding. Also discussed considering IUD or nexplanon and she would be open to discussing further in the future if needed.  - norethindrone-ethinyl estradiol (MICROGESTIN 1.5/30) 1.5-30 MG-MCG tablet; Take 1 tablet by mouth daily.       Subjective   Laura is a 23 year old, presenting for the following health issues:  med check (Possibly higher dose of birth control to stop bleeding)      9/18/2024     3:53 PM   Additional Questions   Roomed by Rosey GOODWIN     Video Start Time: 4:08 PM    History of Present Illness       Reason for visit:  Contraceptive questions, menstration concern/follow up    She eats 0-1 servings of fruits and vegetables daily.She consumes 1 sweetened beverage(s) daily.She exercises with enough effort to increase her heart rate 30 to 60 minutes per day.  She exercises with enough effort to increase her heart rate 4 days per week.   She is taking medications regularly.     Has been having issues with periods since around December 2023 - periods heavier and more painful. No changes to birth control pill over this time. She saw OB/GYN 2/9/24 - had pelvic US  showing right ovarian cyst (6.1 cm) on 2/9/24. Repeat US on 4/16/24 showed resolution of cyst. Periods improved for a few months but now are heavier and more painful in last few months. Also noticing some breakthrough bleeding. Still taking birth control pill consistently, has not missed any doses.    Would like to continue with oral birth control pill if possible but wonders if she needs a change in medication or dose. Non-smoker, no migraine with aura, no family or personal history of blood clotting disorder. No HTN.     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    Physical Exam   GENERAL: alert and no distress  EYES: Eyes grossly normal to inspection.  No discharge or erythema, or obvious scleral/conjunctival abnormalities.  RESP: No audible wheeze, cough, or visible cyanosis.    SKIN: Visible skin clear. No significant rash, abnormal pigmentation or lesions.  NEURO: Cranial nerves grossly intact.  Mentation and speech appropriate for age.  PSYCH: Appropriate affect, tone, and pace of words        Video-Visit Details    Type of service:  Video Visit   Video End Time:4:24 PM  Originating Location (pt. Location): Home    Distant Location (provider location):  On-site  Platform used for Video Visit: Kassi  Signed Electronically by: Bibiana Palacio PA-C

## 2024-11-26 ENCOUNTER — MYC REFILL (OUTPATIENT)
Dept: MIDWIFE SERVICES | Facility: CLINIC | Age: 24
End: 2024-11-26
Payer: COMMERCIAL

## 2024-11-26 DIAGNOSIS — B00.1 COLD SORE: ICD-10-CM

## 2024-11-26 RX ORDER — VALACYCLOVIR HYDROCHLORIDE 1 G/1
2000 TABLET, FILM COATED ORAL 2 TIMES DAILY
Qty: 4 TABLET | Refills: 5 | Status: SHIPPED | OUTPATIENT
Start: 2024-11-26

## 2024-11-26 NOTE — TELEPHONE ENCOUNTER
Medication refill request received from patient.  Medication requested:    Pending Prescriptions:                       Disp   Refills    valACYclovir (VALTREX) 1000 mg tablet     4 tabl*5            Sig: Take 2 tablets (2,000 mg) by mouth 2 times daily.    Medication last prescribed: 4/13/24  Last visit with [facility] CNM group: 02/09/24  Upcoming appointments: None    Refill request routed to on-call CNM for review.

## 2025-01-14 ENCOUNTER — PATIENT OUTREACH (OUTPATIENT)
Dept: CARE COORDINATION | Facility: CLINIC | Age: 25
End: 2025-01-14
Payer: COMMERCIAL

## 2025-02-23 SDOH — HEALTH STABILITY: PHYSICAL HEALTH: ON AVERAGE, HOW MANY MINUTES DO YOU ENGAGE IN EXERCISE AT THIS LEVEL?: 50 MIN

## 2025-02-23 SDOH — HEALTH STABILITY: PHYSICAL HEALTH: ON AVERAGE, HOW MANY DAYS PER WEEK DO YOU ENGAGE IN MODERATE TO STRENUOUS EXERCISE (LIKE A BRISK WALK)?: 5 DAYS

## 2025-02-23 ASSESSMENT — SOCIAL DETERMINANTS OF HEALTH (SDOH): HOW OFTEN DO YOU GET TOGETHER WITH FRIENDS OR RELATIVES?: MORE THAN THREE TIMES A WEEK

## 2025-02-24 ENCOUNTER — OFFICE VISIT (OUTPATIENT)
Dept: FAMILY MEDICINE | Facility: CLINIC | Age: 25
End: 2025-02-24
Attending: PHYSICIAN ASSISTANT
Payer: COMMERCIAL

## 2025-02-24 VITALS
RESPIRATION RATE: 16 BRPM | BODY MASS INDEX: 32.14 KG/M2 | WEIGHT: 200 LBS | HEIGHT: 66 IN | TEMPERATURE: 98.4 F | HEART RATE: 69 BPM | DIASTOLIC BLOOD PRESSURE: 64 MMHG | SYSTOLIC BLOOD PRESSURE: 116 MMHG | OXYGEN SATURATION: 98 %

## 2025-02-24 DIAGNOSIS — Z00.00 ROUTINE GENERAL MEDICAL EXAMINATION AT A HEALTH CARE FACILITY: Primary | ICD-10-CM

## 2025-02-24 DIAGNOSIS — B00.1 COLD SORE: ICD-10-CM

## 2025-02-24 DIAGNOSIS — Z13.1 SCREENING FOR DIABETES MELLITUS: ICD-10-CM

## 2025-02-24 DIAGNOSIS — Z11.3 SCREENING FOR STDS (SEXUALLY TRANSMITTED DISEASES): ICD-10-CM

## 2025-02-24 DIAGNOSIS — Z13.220 LIPID SCREENING: ICD-10-CM

## 2025-02-24 DIAGNOSIS — E66.811 OBESITY (BMI 30.0-34.9): ICD-10-CM

## 2025-02-24 DIAGNOSIS — Z30.41 ENCOUNTER FOR SURVEILLANCE OF CONTRACEPTIVE PILLS: ICD-10-CM

## 2025-02-24 PROBLEM — R55 VASOVAGAL SYNCOPE: Status: RESOLVED | Noted: 2022-04-01 | Resolved: 2025-02-24

## 2025-02-24 PROBLEM — E66.3 OVERWEIGHT (BMI 25.0-29.9): Status: RESOLVED | Noted: 2021-06-04 | Resolved: 2025-02-24

## 2025-02-24 LAB
C TRACH DNA SPEC QL PROBE+SIG AMP: NEGATIVE
CHOLEST SERPL-MCNC: 186 MG/DL
FASTING STATUS PATIENT QL REPORTED: YES
FASTING STATUS PATIENT QL REPORTED: YES
GLUCOSE SERPL-MCNC: 92 MG/DL (ref 70–99)
HDLC SERPL-MCNC: 50 MG/DL
LDLC SERPL CALC-MCNC: 123 MG/DL
N GONORRHOEA DNA SPEC QL NAA+PROBE: NEGATIVE
NONHDLC SERPL-MCNC: 136 MG/DL
SPECIMEN TYPE: NORMAL
TRIGL SERPL-MCNC: 63 MG/DL

## 2025-02-24 PROCEDURE — 87491 CHLMYD TRACH DNA AMP PROBE: CPT | Performed by: PHYSICIAN ASSISTANT

## 2025-02-24 PROCEDURE — 90471 IMMUNIZATION ADMIN: CPT | Performed by: PHYSICIAN ASSISTANT

## 2025-02-24 PROCEDURE — 87591 N.GONORRHOEAE DNA AMP PROB: CPT | Performed by: PHYSICIAN ASSISTANT

## 2025-02-24 PROCEDURE — 82947 ASSAY GLUCOSE BLOOD QUANT: CPT | Performed by: PHYSICIAN ASSISTANT

## 2025-02-24 PROCEDURE — 99395 PREV VISIT EST AGE 18-39: CPT | Mod: 25 | Performed by: PHYSICIAN ASSISTANT

## 2025-02-24 PROCEDURE — 91320 SARSCV2 VAC 30MCG TRS-SUC IM: CPT | Performed by: PHYSICIAN ASSISTANT

## 2025-02-24 PROCEDURE — 90656 IIV3 VACC NO PRSV 0.5 ML IM: CPT | Performed by: PHYSICIAN ASSISTANT

## 2025-02-24 PROCEDURE — 80061 LIPID PANEL: CPT | Performed by: PHYSICIAN ASSISTANT

## 2025-02-24 PROCEDURE — 36415 COLL VENOUS BLD VENIPUNCTURE: CPT | Performed by: PHYSICIAN ASSISTANT

## 2025-02-24 PROCEDURE — 90480 ADMN SARSCOV2 VAC 1/ONLY CMP: CPT | Performed by: PHYSICIAN ASSISTANT

## 2025-02-24 RX ORDER — NORETHINDRONE ACETATE AND ETHINYL ESTRADIOL .03; 1.5 MG/1; MG/1
1 TABLET ORAL DAILY
Qty: 84 TABLET | Refills: 4 | Status: SHIPPED | OUTPATIENT
Start: 2025-02-24

## 2025-02-24 RX ORDER — VALACYCLOVIR HYDROCHLORIDE 1 G/1
2000 TABLET, FILM COATED ORAL 2 TIMES DAILY
Qty: 20 TABLET | Refills: 1 | Status: SHIPPED | OUTPATIENT
Start: 2025-02-24

## 2025-02-24 NOTE — PATIENT INSTRUCTIONS
Patient Education   Preventive Care Advice   This is general advice given by our system to help you stay healthy. However, your care team may have specific advice just for you. Please talk to your care team about your preventive care needs.  Nutrition  Eat 5 or more servings of fruits and vegetables each day.  Try wheat bread, brown rice and whole grain pasta (instead of white bread, rice, and pasta).  Get enough calcium and vitamin D. Check the label on foods and aim for 100% of the RDA (recommended daily allowance).  Lifestyle  Exercise at least 150 minutes each week  (30 minutes a day, 5 days a week).  Do muscle strengthening activities 2 days a week. These help control your weight and prevent disease.  No smoking.  Wear sunscreen to prevent skin cancer.  Have a dental exam and cleaning every 6 months.  Yearly exams  See your health care team every year to talk about:  Any changes in your health.  Any medicines your care team has prescribed.  Preventive care, family planning, and ways to prevent chronic diseases.  Shots (vaccines)   HPV shots (up to age 26), if you've never had them before.  Hepatitis B shots (up to age 59), if you've never had them before.  COVID-19 shot: Get this shot when it's due.  Flu shot: Get a flu shot every year.  Tetanus shot: Get a tetanus shot every 10 years.  Pneumococcal, hepatitis A, and RSV shots: Ask your care team if you need these based on your risk.  Shingles shot (for age 50 and up)  General health tests  Diabetes screening:  Starting at age 35, Get screened for diabetes at least every 3 years.  If you are younger than age 35, ask your care team if you should be screened for diabetes.  Cholesterol test: At age 39, start having a cholesterol test every 5 years, or more often if advised.  Bone density scan (DEXA): At age 50, ask your care team if you should have this scan for osteoporosis (brittle bones).  Hepatitis C: Get tested at least once in your life.  STIs (sexually  transmitted infections)  Before age 24: Ask your care team if you should be screened for STIs.  After age 24: Get screened for STIs if you're at risk. You are at risk for STIs (including HIV) if:  You are sexually active with more than one person.  You don't use condoms every time.  You or a partner was diagnosed with a sexually transmitted infection.  If you are at risk for HIV, ask about PrEP medicine to prevent HIV.  Get tested for HIV at least once in your life, whether you are at risk for HIV or not.  Cancer screening tests  Cervical cancer screening: If you have a cervix, begin getting regular cervical cancer screening tests starting at age 21.  Breast cancer scan (mammogram): If you've ever had breasts, begin having regular mammograms starting at age 40. This is a scan to check for breast cancer.  Colon cancer screening: It is important to start screening for colon cancer at age 45.  Have a colonoscopy test every 10 years (or more often if you're at risk) Or, ask your provider about stool tests like a FIT test every year or Cologuard test every 3 years.  To learn more about your testing options, visit:   .  For help making a decision, visit:   https://bit.ly/yj55074.  Prostate cancer screening test: If you have a prostate, ask your care team if a prostate cancer screening test (PSA) at age 55 is right for you.  Lung cancer screening: If you are a current or former smoker ages 50 to 80, ask your care team if ongoing lung cancer screenings are right for you.  For informational purposes only. Not to replace the advice of your health care provider. Copyright   2023 [facility]. All rights reserved. Clinically reviewed by the [facility]itions [facility]. [employer] 241765 - REV 01/24.  Learning About Stress  What is stress?     Stress is your body's response to a hard situation. Your body can have a physical, emotional, or mental response. Stress is a fact of life for most people, and it  affects everyone differently. What causes stress for you may not be stressful for someone else.  A lot of things can cause stress. You may feel stress when you go on a job interview, take a test, or run a race. This kind of short-term stress is normal and even useful. It can help you if you need to work hard or react quickly. For example, stress can help you finish an important job on time.  Long-term stress is caused by ongoing stressful situations or events. Examples of long-term stress include long-term health problems, ongoing problems at work, or conflicts in your family. Long-term stress can harm your health.  How does stress affect your health?  When you are stressed, your body responds as though you are in danger. It makes hormones that speed up your heart, make you breathe faster, and give you a burst of energy. This is called the fight-or-flight stress response. If the stress is over quickly, your body goes back to normal and no harm is done.  But if stress happens too often or lasts too long, it can have bad effects. Long-term stress can make you more likely to get sick, and it can make symptoms of some diseases worse. If you tense up when you are stressed, you may develop neck, shoulder, or low back pain. Stress is linked to high blood pressure and heart disease.  Stress also harms your emotional health. It can make you hunt, tense, or depressed. Your relationships may suffer, and you may not do well at work or school.  What can you do to manage stress?  You can try these things to help manage stress:   Do something active. Exercise or activity can help reduce stress. Walking is a great way to get started. Even everyday activities such as housecleaning or yard work can help.  Try yoga or chavo chi. These techniques combine exercise and meditation. You may need some training at first to learn them.  Do something you enjoy. For example, listen to music or go to a movie. Practice your hobby or do volunteer  "work.  Meditate. This can help you relax, because you are not worrying about what happened before or what may happen in the future.  Do guided imagery. Imagine yourself in any setting that helps you feel calm. You can use online videos, books, or a teacher to guide you.  Do breathing exercises. For example:  From a standing position, bend forward from the waist with your knees slightly bent. Let your arms dangle close to the floor.  Breathe in slowly and deeply as you return to a standing position. Roll up slowly and lift your head last.  Hold your breath for just a few seconds in the standing position.  Breathe out slowly and bend forward from the waist.  Let your feelings out. Talk, laugh, cry, and express anger when you need to. Talking with supportive friends or family, a counselor, or a becki leader about your feelings is a healthy way to relieve stress. Avoid discussing your feelings with people who make you feel worse.  Write. It may help to write about things that are bothering you. This helps you find out how much stress you feel and what is causing it. When you know this, you can find better ways to cope.  What can you do to prevent stress?  You might try some of these things to help prevent stress:  Manage your time. This helps you find time to do the things you want and need to do.  Get enough sleep. Your body recovers from the stresses of the day while you are sleeping.  Get support. Your family, friends, and community can make a difference in how you experience stress.  Limit your news feed. Avoid or limit time on social media or news that may make you feel stressed.  Do something active. Exercise or activity can help reduce stress. Walking is a great way to get started.  Where can you learn more?  Go to https://www.[employer].net/patiented  Enter N032 in the search box to learn more about \"Learning About Stress.\"  Current as of: October 24, 2023  Content Version: 14.3    2024 [email or web address]. "   Care instructions adapted under license by your healthcare professional. If you have questions about a medical condition or this instruction, always ask your healthcare professional. A.P [employer], [employer] disclaims any warranty or liability for your use of this information.

## 2025-02-24 NOTE — PROGRESS NOTES
"Preventive Care Visit  [facility] KEVEN Palacio PA-C, Family Medicine  Feb 24, 2025      Assessment & Plan     Routine general medical examination at a health care facility    Screening for STDs (sexually transmitted diseases)  No concerns with STI. Not currently sexually active but does use condoms when needed.  - Chlamydia trachomatis/Neisseria gonorrhoeae by PCR- VAGINAL SELF-SWAB; Future    Screening for diabetes mellitus  - Glucose; Future    Lipid screening  She is fasting today. Obtaining baseline screening.  - Lipid panel reflex to direct LDL Fasting; Future    Cold sore  Uses PRN for herpes labialis, refilling  - valACYclovir (VALTREX) 1000 mg tablet; Take 2 tablets (2,000 mg) by mouth 2 times daily.    Encounter for surveillance of contraceptive pills  Bleeding improved with higher dose of JOSI, now having regular monthly periods. Overall still has heavy periods but not as heavy as previously. No contraindications. Would like to continue with same. Not currently sexually active but does use condoms when needed.  - norethindrone-ethinyl estradiol (MICROGESTIN 1.5/30) 1.5-30 MG-MCG tablet; Take 1 tablet by mouth daily.    Obesity (BMI 30.0-34.9)  BMI  Estimated body mass index is 31.99 kg/m  as calculated from the following:    Height as of this encounter: 1.684 m (5' 6.3\").    Weight as of this encounter: 90.7 kg (200 lb).   Weight management plan: Discussed healthy diet and exercise guidelines    Counseling  Appropriate preventive services were discussed with this patient.  Checklist reviewing preventive services available has been given to the patient.    The longitudinal plan of care for the diagnosis(es)/condition(s) as documented were addressed during this visit. Due to the added complexity in care, I will continue to support Laura in the subsequent management and with ongoing continuity of care.      Kimberly Sanchez is a 24 year old, presenting for the " following:  Physical        2/24/2025     9:39 AM   Additional Questions   Roomed by vanessa          HPI    OCP  Non-smoker, no migraine with aura, no family or personal history of blood clotting disorder. No HTN.      Health Care Directive  Patient does not have a Health Care Directive: Discussed advance care planning with patient; however, patient declined at this time.      2/23/2025   General Health   How would you rate your overall physical health? Good   Feel stress (tense, anxious, or unable to sleep) Rather much   (!) STRESS CONCERN      2/23/2025   Nutrition   Three or more servings of calcium each day? Yes   Diet: Regular (no restrictions)   How many servings of fruit and vegetables per day? (!) 0-1   How many sweetened beverages each day? 0-1         2/23/2025   Exercise   Days per week of moderate/strenous exercise 5 days   Average minutes spent exercising at this level 50 min         2/23/2025   Social Factors   Frequency of gathering with friends or relatives More than three times a week   Worry food won't last until get money to buy more No   Food not last or not have enough money for food? No   Do you have housing? (Housing is defined as stable permanent housing and does not include staying ouside in a car, in a tent, in an abandoned building, in an overnight shelter, or couch-surfing.) Yes   Are you worried about losing your housing? No   Lack of transportation? No   Unable to get utilities (heat,electricity)? No   Want help with housing or utility concern? No         2/23/2025   Dental   Dentist two times every year? Yes            Today's PHQ-2 Score:       2/23/2025    10:27 AM   PHQ-2 ( 1999 [employer])   Q1: Little interest or pleasure in doing things 1   Q2: Feeling down, depressed or hopeless 0   PHQ-2 Score 1    Q1: Little interest or pleasure in doing things Several days   Q2: Feeling down, depressed or hopeless Not at all   PHQ-2 Score 1       Patient-reported           2/23/2025   Substance Use  "  Alcohol more than 3/day or more than 7/wk No   Do you use any other substances recreationally? No     Social History     Tobacco Use    Smoking status: Never     Passive exposure: Current    Smokeless tobacco: Never   Vaping Use    Vaping status: Never Used   Substance Use Topics    Alcohol use: Yes     Comment: social, 2-3 drinks per month    Drug use: Never           2/23/2025   STI Screening   New sexual partner(s) since last STI/HIV test? No     History of abnormal Pap smear:         2/9/2024    11:52 AM   PAP / HPV   PAP Negative for Intraepithelial Lesion or Malignancy (NILM)            2/23/2025   Contraception/Family Planning   Questions about contraception or family planning No        Reviewed and updated as needed this visit by Provider   Tobacco  Allergies  Meds  Problems  Med Hx  Surg Hx  Fam Hx                  Review of Systems  Constitutional, neuro, ENT, endocrine, pulmonary, cardiac, gastrointestinal, genitourinary, musculoskeletal, integument and psychiatric systems are negative, except as otherwise noted.     Objective    Exam  /64   Pulse 69   Temp 98.4  F (36.9  C)   Resp 16   Ht 1.684 m (5' 6.3\")   Wt 90.7 kg (200 lb)   LMP 02/03/2025 (Within Days)   SpO2 98%   Breastfeeding No   BMI 31.99 kg/m     Estimated body mass index is 31.99 kg/m  as calculated from the following:    Height as of this encounter: 1.684 m (5' 6.3\").    Weight as of this encounter: 90.7 kg (200 lb).    Physical Exam  GENERAL: alert and no distress  EYES: Eyes grossly normal to inspection, PERRL and conjunctivae and sclerae normal  HENT: ear canals and TM's normal, nose and mouth without ulcers or lesions  NECK: no adenopathy, no asymmetry, masses, or scars  RESP: lungs clear to auscultation - no rales, rhonchi or wheezes  CV: regular rate and rhythm, normal S1 S2, no S3 or S4, no murmur, click or rub, no peripheral edema  ABDOMEN: soft, nontender, no hepatosplenomegaly, no masses and bowel sounds " normal  MS: no gross musculoskeletal defects noted, no edema  NEURO: Normal strength and tone, mentation intact and speech normal  PSYCH: mentation appears normal, affect normal/bright        Signed Electronically by: Bibiana Palacio PA-C